# Patient Record
Sex: FEMALE | Race: WHITE | NOT HISPANIC OR LATINO | URBAN - METROPOLITAN AREA
[De-identification: names, ages, dates, MRNs, and addresses within clinical notes are randomized per-mention and may not be internally consistent; named-entity substitution may affect disease eponyms.]

---

## 2017-01-05 ENCOUNTER — OFFICE (AMBULATORY)
Dept: URBAN - METROPOLITAN AREA CLINIC 64 | Facility: CLINIC | Age: 47
End: 2017-01-05

## 2017-01-05 VITALS
HEIGHT: 67 IN | WEIGHT: 207 LBS | HEART RATE: 97 BPM | DIASTOLIC BLOOD PRESSURE: 81 MMHG | SYSTOLIC BLOOD PRESSURE: 136 MMHG

## 2017-01-05 DIAGNOSIS — R10.13 EPIGASTRIC PAIN: ICD-10-CM

## 2017-01-05 DIAGNOSIS — K25.9 GASTRIC ULCER, UNSPECIFIED AS ACUTE OR CHRONIC, WITHOUT HEMO: ICD-10-CM

## 2017-01-05 DIAGNOSIS — R10.12 LEFT UPPER QUADRANT PAIN: ICD-10-CM

## 2017-01-05 PROCEDURE — 99214 OFFICE O/P EST MOD 30 MIN: CPT | Performed by: INTERNAL MEDICINE

## 2017-01-05 RX ORDER — PANTOPRAZOLE SODIUM 40 MG/1
80 TABLET, DELAYED RELEASE ORAL
Qty: 60 | Refills: 11 | Status: ACTIVE
Start: 2017-01-05

## 2017-01-06 ENCOUNTER — OFFICE (AMBULATORY)
Dept: URBAN - METROPOLITAN AREA CLINIC 64 | Facility: CLINIC | Age: 47
End: 2017-01-06

## 2017-01-06 ENCOUNTER — ON CAMPUS - OUTPATIENT (AMBULATORY)
Dept: URBAN - METROPOLITAN AREA HOSPITAL 2 | Facility: HOSPITAL | Age: 47
End: 2017-01-06

## 2017-01-06 VITALS
OXYGEN SATURATION: 100 % | SYSTOLIC BLOOD PRESSURE: 100 MMHG | RESPIRATION RATE: 15 BRPM | SYSTOLIC BLOOD PRESSURE: 121 MMHG | OXYGEN SATURATION: 95 % | OXYGEN SATURATION: 98 % | OXYGEN SATURATION: 96 % | DIASTOLIC BLOOD PRESSURE: 56 MMHG | HEART RATE: 68 BPM | RESPIRATION RATE: 16 BRPM | HEART RATE: 72 BPM | HEART RATE: 69 BPM | TEMPERATURE: 97.4 F | DIASTOLIC BLOOD PRESSURE: 75 MMHG | HEIGHT: 67 IN | SYSTOLIC BLOOD PRESSURE: 143 MMHG | SYSTOLIC BLOOD PRESSURE: 125 MMHG | OXYGEN SATURATION: 97 % | HEART RATE: 89 BPM | DIASTOLIC BLOOD PRESSURE: 74 MMHG | SYSTOLIC BLOOD PRESSURE: 102 MMHG | OXYGEN SATURATION: 99 % | SYSTOLIC BLOOD PRESSURE: 119 MMHG | RESPIRATION RATE: 14 BRPM | DIASTOLIC BLOOD PRESSURE: 76 MMHG | DIASTOLIC BLOOD PRESSURE: 78 MMHG | WEIGHT: 201 LBS | DIASTOLIC BLOOD PRESSURE: 55 MMHG | HEART RATE: 70 BPM | SYSTOLIC BLOOD PRESSURE: 136 MMHG | RESPIRATION RATE: 18 BRPM | HEART RATE: 74 BPM

## 2017-01-06 DIAGNOSIS — K44.9 DIAPHRAGMATIC HERNIA WITHOUT OBSTRUCTION OR GANGRENE: ICD-10-CM

## 2017-01-06 DIAGNOSIS — K29.70 GASTRITIS, UNSPECIFIED, WITHOUT BLEEDING: ICD-10-CM

## 2017-01-06 DIAGNOSIS — R10.13 EPIGASTRIC PAIN: ICD-10-CM

## 2017-01-06 LAB
GI HISTOLOGY: A. SELECT: (no result)
GI HISTOLOGY: PDF REPORT: (no result)

## 2017-01-06 PROCEDURE — 88305 TISSUE EXAM BY PATHOLOGIST: CPT | Performed by: INTERNAL MEDICINE

## 2017-01-06 PROCEDURE — 43239 EGD BIOPSY SINGLE/MULTIPLE: CPT | Performed by: INTERNAL MEDICINE

## 2017-01-06 RX ORDER — TRAMADOL HYDROCHLORIDE 50 MG/1
200 TABLET, FILM COATED ORAL
Qty: 120 | Refills: 0 | Status: ACTIVE
Start: 2017-01-06

## 2017-01-06 RX ADMIN — PROPOFOL: 10 INJECTION, EMULSION INTRAVENOUS at 13:50

## 2017-02-13 ENCOUNTER — ON CAMPUS - OUTPATIENT (AMBULATORY)
Dept: URBAN - METROPOLITAN AREA HOSPITAL 77 | Facility: HOSPITAL | Age: 47
End: 2017-02-13

## 2017-02-13 DIAGNOSIS — R10.13 EPIGASTRIC PAIN: ICD-10-CM

## 2017-02-13 DIAGNOSIS — K86.2 CYST OF PANCREAS: ICD-10-CM

## 2017-02-13 DIAGNOSIS — R11.0 NAUSEA: ICD-10-CM

## 2017-02-13 DIAGNOSIS — K86.1 OTHER CHRONIC PANCREATITIS: ICD-10-CM

## 2017-02-13 PROCEDURE — 43237 ENDOSCOPIC US EXAM ESOPH: CPT | Performed by: INTERNAL MEDICINE

## 2017-03-01 ENCOUNTER — OFFICE (AMBULATORY)
Dept: URBAN - METROPOLITAN AREA CLINIC 64 | Facility: CLINIC | Age: 47
End: 2017-03-01

## 2017-03-01 VITALS
SYSTOLIC BLOOD PRESSURE: 138 MMHG | HEART RATE: 74 BPM | DIASTOLIC BLOOD PRESSURE: 68 MMHG | WEIGHT: 195 LBS | HEIGHT: 67 IN

## 2017-03-01 DIAGNOSIS — R10.13 EPIGASTRIC PAIN: ICD-10-CM

## 2017-03-01 DIAGNOSIS — K86.1 OTHER CHRONIC PANCREATITIS: ICD-10-CM

## 2017-03-01 PROCEDURE — 99214 OFFICE O/P EST MOD 30 MIN: CPT | Performed by: INTERNAL MEDICINE

## 2017-03-01 RX ORDER — TRAMADOL HYDROCHLORIDE 50 MG/1
200 TABLET, FILM COATED ORAL
Qty: 120 | Refills: 0 | Status: ACTIVE
Start: 2017-01-06

## 2017-03-01 RX ORDER — PANCRELIPASE 24000; 76000; 120000 [USP'U]/1; [USP'U]/1; [USP'U]/1
72 CAPSULE, DELAYED RELEASE PELLETS ORAL
Qty: 90 | Refills: 11 | Status: ACTIVE
Start: 2017-03-01

## 2017-03-02 LAB
726778 7+ALC-UNBUND: AMPHETAMINES, URINE: NEGATIVE NG/ML
726778 7+ALC-UNBUND: BARBITURATE: NEGATIVE NG/ML
726778 7+ALC-UNBUND: BENZODIAZEPINES: NEGATIVE NG/ML
726778 7+ALC-UNBUND: CANNABINOID: NEGATIVE NG/ML
726778 7+ALC-UNBUND: COCAINE (METAB.): NEGATIVE NG/ML
726778 7+ALC-UNBUND: ETHANOL U, QUAN: NEGATIVE %
726778 7+ALC-UNBUND: OPIATES: NEGATIVE NG/ML
726778 7+ALC-UNBUND: PHENCYCLIDINE: NEGATIVE NG/ML
AMYLASE, SERUM: 20 U/L — LOW (ref 31–124)
C-REACTIVE PROTEIN, QUANT: 1.2 MG/L (ref 0–4.9)
CBC WITH DIFFERENTIAL/PLATELET: BASO (ABSOLUTE): 0 X10E3/UL (ref 0–0.2)
CBC WITH DIFFERENTIAL/PLATELET: BASOS: 0 %
CBC WITH DIFFERENTIAL/PLATELET: EOS (ABSOLUTE): 0.1 X10E3/UL (ref 0–0.4)
CBC WITH DIFFERENTIAL/PLATELET: EOS: 1 %
CBC WITH DIFFERENTIAL/PLATELET: HEMATOCRIT: 39 % (ref 34–46.6)
CBC WITH DIFFERENTIAL/PLATELET: HEMATOLOGY COMMENTS: (no result)
CBC WITH DIFFERENTIAL/PLATELET: HEMOGLOBIN: 13 G/DL (ref 11.1–15.9)
CBC WITH DIFFERENTIAL/PLATELET: IMMATURE CELLS: (no result)
CBC WITH DIFFERENTIAL/PLATELET: IMMATURE GRANS (ABS): 0 X10E3/UL (ref 0–0.1)
CBC WITH DIFFERENTIAL/PLATELET: IMMATURE GRANULOCYTES: 0 %
CBC WITH DIFFERENTIAL/PLATELET: LYMPHS (ABSOLUTE): 1.9 X10E3/UL (ref 0.7–3.1)
CBC WITH DIFFERENTIAL/PLATELET: LYMPHS: 28 %
CBC WITH DIFFERENTIAL/PLATELET: MCH: 29.8 PG (ref 26.6–33)
CBC WITH DIFFERENTIAL/PLATELET: MCHC: 33.3 G/DL (ref 31.5–35.7)
CBC WITH DIFFERENTIAL/PLATELET: MCV: 89 FL (ref 79–97)
CBC WITH DIFFERENTIAL/PLATELET: MONOCYTES(ABSOLUTE): 0.3 X10E3/UL (ref 0.1–0.9)
CBC WITH DIFFERENTIAL/PLATELET: MONOCYTES: 4 %
CBC WITH DIFFERENTIAL/PLATELET: NEUTROPHILS (ABSOLUTE): 4.6 X10E3/UL (ref 1.4–7)
CBC WITH DIFFERENTIAL/PLATELET: NEUTROPHILS: 67 %
CBC WITH DIFFERENTIAL/PLATELET: NRBC: (no result)
CBC WITH DIFFERENTIAL/PLATELET: PLATELETS: 387 X10E3/UL — HIGH (ref 150–379)
CBC WITH DIFFERENTIAL/PLATELET: RBC: 4.36 X10E6/UL (ref 3.77–5.28)
CBC WITH DIFFERENTIAL/PLATELET: RDW: 13.9 % (ref 12.3–15.4)
CBC WITH DIFFERENTIAL/PLATELET: WBC: 6.8 X10E3/UL (ref 3.4–10.8)
COMP. METABOLIC PANEL (14): A/G RATIO: 1.7 (ref 1.1–2.5)
COMP. METABOLIC PANEL (14): ALBUMIN, SERUM: 4.4 G/DL (ref 3.5–5.5)
COMP. METABOLIC PANEL (14): ALKALINE PHOSPHATASE, S: 70 IU/L (ref 39–117)
COMP. METABOLIC PANEL (14): ALT (SGPT): 20 IU/L (ref 0–32)
COMP. METABOLIC PANEL (14): AST (SGOT): 12 IU/L (ref 0–40)
COMP. METABOLIC PANEL (14): BILIRUBIN, TOTAL: 0.5 MG/DL (ref 0–1.2)
COMP. METABOLIC PANEL (14): BUN/CREATININE RATIO: 9 (ref 9–23)
COMP. METABOLIC PANEL (14): BUN: 8 MG/DL (ref 6–24)
COMP. METABOLIC PANEL (14): CALCIUM, SERUM: 9.5 MG/DL (ref 8.7–10.2)
COMP. METABOLIC PANEL (14): CARBON DIOXIDE, TOTAL: 22 MMOL/L (ref 18–29)
COMP. METABOLIC PANEL (14): CHLORIDE, SERUM: 105 MMOL/L (ref 96–106)
COMP. METABOLIC PANEL (14): CREATININE, SERUM: 0.88 MG/DL (ref 0.57–1)
COMP. METABOLIC PANEL (14): EGFR IF AFRICN AM: 91 ML/MIN/1.73 (ref 59–?)
COMP. METABOLIC PANEL (14): EGFR IF NONAFRICN AM: 79 ML/MIN/1.73 (ref 59–?)
COMP. METABOLIC PANEL (14): GLOBULIN, TOTAL: 2.6 G/DL (ref 1.5–4.5)
COMP. METABOLIC PANEL (14): GLUCOSE, SERUM: 81 MG/DL (ref 65–99)
COMP. METABOLIC PANEL (14): POTASSIUM, SERUM: 4.2 MMOL/L (ref 3.5–5.2)
COMP. METABOLIC PANEL (14): PROTEIN, TOTAL, SERUM: 7 G/DL (ref 6–8.5)
COMP. METABOLIC PANEL (14): SODIUM, SERUM: 142 MMOL/L (ref 134–144)
LIPASE, SERUM: 19 U/L (ref 0–59)

## 2017-03-09 ENCOUNTER — ON CAMPUS - OUTPATIENT (AMBULATORY)
Dept: URBAN - METROPOLITAN AREA HOSPITAL 85 | Facility: HOSPITAL | Age: 47
End: 2017-03-09

## 2017-03-09 DIAGNOSIS — R19.7 DIARRHEA, UNSPECIFIED: ICD-10-CM

## 2017-03-09 DIAGNOSIS — R10.11 RIGHT UPPER QUADRANT PAIN: ICD-10-CM

## 2017-03-09 DIAGNOSIS — R11.2 NAUSEA WITH VOMITING, UNSPECIFIED: ICD-10-CM

## 2017-03-09 DIAGNOSIS — R63.4 ABNORMAL WEIGHT LOSS: ICD-10-CM

## 2017-03-09 PROCEDURE — 99243 OFF/OP CNSLTJ NEW/EST LOW 30: CPT | Performed by: NURSE PRACTITIONER

## 2017-03-10 PROCEDURE — 99213 OFFICE O/P EST LOW 20 MIN: CPT | Performed by: NURSE PRACTITIONER

## 2017-05-03 ENCOUNTER — OFFICE (AMBULATORY)
Dept: URBAN - METROPOLITAN AREA CLINIC 64 | Facility: CLINIC | Age: 47
End: 2017-05-03
Payer: COMMERCIAL

## 2017-05-03 VITALS
SYSTOLIC BLOOD PRESSURE: 128 MMHG | HEIGHT: 67 IN | HEART RATE: 78 BPM | DIASTOLIC BLOOD PRESSURE: 69 MMHG | WEIGHT: 208 LBS

## 2017-05-03 DIAGNOSIS — R10.11 RIGHT UPPER QUADRANT PAIN: ICD-10-CM

## 2017-05-03 DIAGNOSIS — K86.1 OTHER CHRONIC PANCREATITIS: ICD-10-CM

## 2017-05-03 PROCEDURE — 99213 OFFICE O/P EST LOW 20 MIN: CPT | Performed by: INTERNAL MEDICINE

## 2019-11-02 ENCOUNTER — APPOINTMENT (OUTPATIENT)
Dept: CT IMAGING | Facility: HOSPITAL | Age: 49
End: 2019-11-02

## 2019-11-02 ENCOUNTER — APPOINTMENT (OUTPATIENT)
Dept: GENERAL RADIOLOGY | Facility: HOSPITAL | Age: 49
End: 2019-11-02

## 2019-11-02 ENCOUNTER — HOSPITAL ENCOUNTER (OUTPATIENT)
Facility: HOSPITAL | Age: 49
Setting detail: OBSERVATION
Discharge: HOME | End: 2019-11-03
Attending: EMERGENCY MEDICINE | Admitting: FAMILY MEDICINE

## 2019-11-02 DIAGNOSIS — I67.4 HYPERTENSIVE ENCEPHALOPATHY: ICD-10-CM

## 2019-11-02 DIAGNOSIS — R42 LIGHTHEADED: Primary | ICD-10-CM

## 2019-11-02 DIAGNOSIS — I16.1 HYPERTENSIVE EMERGENCY: ICD-10-CM

## 2019-11-02 LAB
ALBUMIN SERPL-MCNC: 4.6 G/DL (ref 3.5–5.2)
ALBUMIN/GLOB SERPL: 1.2 G/DL
ALP SERPL-CCNC: 92 U/L (ref 39–117)
ALT SERPL W P-5'-P-CCNC: 12 U/L (ref 1–33)
AMPHET+METHAMPHET UR QL: POSITIVE
ANION GAP SERPL CALCULATED.3IONS-SCNC: 13 MMOL/L (ref 5–15)
AST SERPL-CCNC: 22 U/L (ref 1–32)
BARBITURATES UR QL SCN: NEGATIVE
BASOPHILS # BLD AUTO: 0.1 10*3/MM3 (ref 0–0.2)
BASOPHILS NFR BLD AUTO: 0.7 % (ref 0–1.5)
BENZODIAZ UR QL SCN: NEGATIVE
BILIRUB SERPL-MCNC: 0.2 MG/DL (ref 0.2–1.2)
BILIRUB UR QL STRIP: NEGATIVE
BUN BLD-MCNC: <2 MG/DL (ref 6–20)
BUN/CREAT SERPL: ABNORMAL
CALCIUM SPEC-SCNC: 9 MG/DL (ref 8.6–10.5)
CANNABINOIDS SERPL QL: NEGATIVE
CHLORIDE SERPL-SCNC: 107 MMOL/L (ref 98–107)
CLARITY UR: CLEAR
CO2 SERPL-SCNC: 19 MMOL/L (ref 22–29)
COCAINE UR QL: NEGATIVE
COLOR UR: YELLOW
CREAT BLD-MCNC: 0.77 MG/DL (ref 0.57–1)
D-LACTATE SERPL-SCNC: 1.3 MMOL/L (ref 0.5–2)
DEPRECATED RDW RBC AUTO: 49 FL (ref 37–54)
EOSINOPHIL # BLD AUTO: 0.1 10*3/MM3 (ref 0–0.4)
EOSINOPHIL NFR BLD AUTO: 1 % (ref 0.3–6.2)
ERYTHROCYTE [DISTWIDTH] IN BLOOD BY AUTOMATED COUNT: 14.5 % (ref 12.3–15.4)
ETHANOL UR QL: <0.01 %
GFR SERPL CREATININE-BSD FRML MDRD: 80 ML/MIN/1.73
GLOBULIN UR ELPH-MCNC: 3.7 GM/DL
GLUCOSE BLD-MCNC: 117 MG/DL (ref 65–99)
GLUCOSE UR STRIP-MCNC: NEGATIVE MG/DL
HCT VFR BLD AUTO: 43.3 % (ref 34–46.6)
HGB BLD-MCNC: 14.6 G/DL (ref 12–15.9)
HGB UR QL STRIP.AUTO: NEGATIVE
HOLD SPECIMEN: NORMAL
HOLD SPECIMEN: NORMAL
KETONES UR QL STRIP: NEGATIVE
LEUKOCYTE ESTERASE UR QL STRIP.AUTO: NEGATIVE
LYMPHOCYTES # BLD AUTO: 2.4 10*3/MM3 (ref 0.7–3.1)
LYMPHOCYTES NFR BLD AUTO: 30.3 % (ref 19.6–45.3)
MCH RBC QN AUTO: 32.2 PG (ref 26.6–33)
MCHC RBC AUTO-ENTMCNC: 33.7 G/DL (ref 31.5–35.7)
MCV RBC AUTO: 95.7 FL (ref 79–97)
METHADONE UR QL SCN: NEGATIVE
MONOCYTES # BLD AUTO: 0.5 10*3/MM3 (ref 0.1–0.9)
MONOCYTES NFR BLD AUTO: 6.4 % (ref 5–12)
NEUTROPHILS # BLD AUTO: 4.9 10*3/MM3 (ref 1.7–7)
NEUTROPHILS NFR BLD AUTO: 61.6 % (ref 42.7–76)
NITRITE UR QL STRIP: NEGATIVE
NRBC BLD AUTO-RTO: 0 /100 WBC (ref 0–0.2)
NT-PROBNP SERPL-MCNC: 68.4 PG/ML (ref 5–450)
OPIATES UR QL: NEGATIVE
OXYCODONE UR QL SCN: NEGATIVE
PH UR STRIP.AUTO: 5.5 [PH] (ref 5–8)
PLATELET # BLD AUTO: 473 10*3/MM3 (ref 140–450)
PMV BLD AUTO: 7.3 FL (ref 6–12)
POTASSIUM BLD-SCNC: 4 MMOL/L (ref 3.5–5.2)
PROT SERPL-MCNC: 8.3 G/DL (ref 6–8.5)
PROT UR QL STRIP: ABNORMAL
RBC # BLD AUTO: 4.53 10*6/MM3 (ref 3.77–5.28)
SODIUM BLD-SCNC: 139 MMOL/L (ref 136–145)
SP GR UR STRIP: 1.02 (ref 1–1.03)
TROPONIN T SERPL-MCNC: <0.01 NG/ML (ref 0–0.03)
UROBILINOGEN UR QL STRIP: ABNORMAL
WBC NRBC COR # BLD: 7.9 10*3/MM3 (ref 3.4–10.8)
WHOLE BLOOD HOLD SPECIMEN: NORMAL
WHOLE BLOOD HOLD SPECIMEN: NORMAL

## 2019-11-02 PROCEDURE — G0378 HOSPITAL OBSERVATION PER HR: HCPCS

## 2019-11-02 PROCEDURE — 80307 DRUG TEST PRSMV CHEM ANLYZR: CPT | Performed by: EMERGENCY MEDICINE

## 2019-11-02 PROCEDURE — 71045 X-RAY EXAM CHEST 1 VIEW: CPT

## 2019-11-02 PROCEDURE — 85025 COMPLETE CBC W/AUTO DIFF WBC: CPT | Performed by: EMERGENCY MEDICINE

## 2019-11-02 PROCEDURE — 96374 THER/PROPH/DIAG INJ IV PUSH: CPT

## 2019-11-02 PROCEDURE — 82962 GLUCOSE BLOOD TEST: CPT

## 2019-11-02 PROCEDURE — 82088 ASSAY OF ALDOSTERONE: CPT | Performed by: FAMILY MEDICINE

## 2019-11-02 PROCEDURE — 83605 ASSAY OF LACTIC ACID: CPT

## 2019-11-02 PROCEDURE — 87040 BLOOD CULTURE FOR BACTERIA: CPT | Performed by: EMERGENCY MEDICINE

## 2019-11-02 PROCEDURE — 80053 COMPREHEN METABOLIC PANEL: CPT | Performed by: EMERGENCY MEDICINE

## 2019-11-02 PROCEDURE — P9612 CATHETERIZE FOR URINE SPEC: HCPCS

## 2019-11-02 PROCEDURE — 84244 ASSAY OF RENIN: CPT | Performed by: FAMILY MEDICINE

## 2019-11-02 PROCEDURE — 80307 DRUG TEST PRSMV CHEM ANLYZR: CPT | Performed by: FAMILY MEDICINE

## 2019-11-02 PROCEDURE — 84484 ASSAY OF TROPONIN QUANT: CPT | Performed by: EMERGENCY MEDICINE

## 2019-11-02 PROCEDURE — 81003 URINALYSIS AUTO W/O SCOPE: CPT | Performed by: EMERGENCY MEDICINE

## 2019-11-02 PROCEDURE — 99219 PR INITIAL OBSERVATION CARE/DAY 50 MINUTES: CPT | Performed by: FAMILY MEDICINE

## 2019-11-02 PROCEDURE — 70450 CT HEAD/BRAIN W/O DYE: CPT

## 2019-11-02 PROCEDURE — 93005 ELECTROCARDIOGRAM TRACING: CPT

## 2019-11-02 PROCEDURE — 93005 ELECTROCARDIOGRAM TRACING: CPT | Performed by: EMERGENCY MEDICINE

## 2019-11-02 PROCEDURE — 83880 ASSAY OF NATRIURETIC PEPTIDE: CPT | Performed by: FAMILY MEDICINE

## 2019-11-02 PROCEDURE — 99285 EMERGENCY DEPT VISIT HI MDM: CPT

## 2019-11-02 RX ORDER — OLANZAPINE 20 MG/1
30 TABLET ORAL NIGHTLY
Status: ON HOLD | COMMUNITY
End: 2020-06-02 | Stop reason: SDUPTHER

## 2019-11-02 RX ORDER — SODIUM CHLORIDE 0.9 % (FLUSH) 0.9 %
10 SYRINGE (ML) INJECTION AS NEEDED
Status: DISCONTINUED | OUTPATIENT
Start: 2019-11-02 | End: 2019-11-03 | Stop reason: HOSPADM

## 2019-11-02 RX ORDER — FAMOTIDINE 20 MG/1
40 TABLET, FILM COATED ORAL DAILY
Status: DISCONTINUED | OUTPATIENT
Start: 2019-11-02 | End: 2019-11-03 | Stop reason: HOSPADM

## 2019-11-02 RX ORDER — KETOROLAC TROMETHAMINE 30 MG/ML
30 INJECTION, SOLUTION INTRAMUSCULAR; INTRAVENOUS EVERY 6 HOURS PRN
Status: DISCONTINUED | OUTPATIENT
Start: 2019-11-02 | End: 2019-11-03 | Stop reason: HOSPADM

## 2019-11-02 RX ORDER — GABAPENTIN 800 MG/1
800 TABLET ORAL 4 TIMES DAILY
COMMUNITY

## 2019-11-02 RX ORDER — ONDANSETRON 4 MG/1
4 TABLET, FILM COATED ORAL EVERY 6 HOURS PRN
Status: DISCONTINUED | OUTPATIENT
Start: 2019-11-02 | End: 2019-11-03 | Stop reason: HOSPADM

## 2019-11-02 RX ORDER — BUPROPION HYDROCHLORIDE 150 MG/1
150 TABLET ORAL DAILY
Status: DISCONTINUED | OUTPATIENT
Start: 2019-11-02 | End: 2019-11-03 | Stop reason: HOSPADM

## 2019-11-02 RX ORDER — ACETAMINOPHEN 325 MG/1
650 TABLET ORAL EVERY 4 HOURS PRN
Status: DISCONTINUED | OUTPATIENT
Start: 2019-11-02 | End: 2019-11-03 | Stop reason: HOSPADM

## 2019-11-02 RX ORDER — ACETAMINOPHEN 650 MG/1
650 SUPPOSITORY RECTAL EVERY 4 HOURS PRN
Status: DISCONTINUED | OUTPATIENT
Start: 2019-11-02 | End: 2019-11-03 | Stop reason: HOSPADM

## 2019-11-02 RX ORDER — BUSPIRONE HYDROCHLORIDE 15 MG/1
15 TABLET ORAL 2 TIMES DAILY
Status: DISCONTINUED | OUTPATIENT
Start: 2019-11-02 | End: 2019-11-03 | Stop reason: HOSPADM

## 2019-11-02 RX ORDER — MIRTAZAPINE 15 MG/1
90 TABLET, FILM COATED ORAL NIGHTLY
Status: DISCONTINUED | OUTPATIENT
Start: 2019-11-02 | End: 2019-11-03 | Stop reason: HOSPADM

## 2019-11-02 RX ORDER — SODIUM CHLORIDE 0.9 % (FLUSH) 0.9 %
10 SYRINGE (ML) INJECTION EVERY 12 HOURS SCHEDULED
Status: DISCONTINUED | OUTPATIENT
Start: 2019-11-02 | End: 2019-11-03 | Stop reason: HOSPADM

## 2019-11-02 RX ORDER — GABAPENTIN 400 MG/1
800 CAPSULE ORAL NIGHTLY
Status: DISCONTINUED | OUTPATIENT
Start: 2019-11-02 | End: 2019-11-02

## 2019-11-02 RX ORDER — AMLODIPINE BESYLATE 5 MG/1
5 TABLET ORAL
Status: DISCONTINUED | OUTPATIENT
Start: 2019-11-02 | End: 2019-11-03 | Stop reason: HOSPADM

## 2019-11-02 RX ORDER — GABAPENTIN 400 MG/1
800 CAPSULE ORAL EVERY 8 HOURS SCHEDULED
Status: DISCONTINUED | OUTPATIENT
Start: 2019-11-02 | End: 2019-11-03 | Stop reason: HOSPADM

## 2019-11-02 RX ORDER — MAGNESIUM SULFATE HEPTAHYDRATE 40 MG/ML
2 INJECTION, SOLUTION INTRAVENOUS AS NEEDED
Status: DISCONTINUED | OUTPATIENT
Start: 2019-11-02 | End: 2019-11-03 | Stop reason: HOSPADM

## 2019-11-02 RX ORDER — POTASSIUM CHLORIDE 20 MEQ/1
40 TABLET, EXTENDED RELEASE ORAL AS NEEDED
Status: DISCONTINUED | OUTPATIENT
Start: 2019-11-02 | End: 2019-11-03 | Stop reason: HOSPADM

## 2019-11-02 RX ORDER — NICOTINE 21 MG/24HR
1 PATCH, TRANSDERMAL 24 HOURS TRANSDERMAL EVERY 24 HOURS
Status: CANCELLED | OUTPATIENT
Start: 2019-11-02

## 2019-11-02 RX ORDER — NITROGLYCERIN 0.4 MG/1
0.4 TABLET SUBLINGUAL
Status: DISCONTINUED | OUTPATIENT
Start: 2019-11-02 | End: 2019-11-03 | Stop reason: HOSPADM

## 2019-11-02 RX ORDER — ONDANSETRON 2 MG/ML
4 INJECTION INTRAMUSCULAR; INTRAVENOUS EVERY 6 HOURS PRN
Status: DISCONTINUED | OUTPATIENT
Start: 2019-11-02 | End: 2019-11-03 | Stop reason: HOSPADM

## 2019-11-02 RX ORDER — BUSPIRONE HYDROCHLORIDE 15 MG/1
15 TABLET ORAL 4 TIMES DAILY PRN
COMMUNITY

## 2019-11-02 RX ORDER — POTASSIUM CHLORIDE 1.5 G/1.77G
40 POWDER, FOR SOLUTION ORAL AS NEEDED
Status: DISCONTINUED | OUTPATIENT
Start: 2019-11-02 | End: 2019-11-03 | Stop reason: HOSPADM

## 2019-11-02 RX ORDER — BUPROPION HYDROCHLORIDE 150 MG/1
300 TABLET ORAL DAILY
COMMUNITY
End: 2020-09-09

## 2019-11-02 RX ORDER — ACETAMINOPHEN 160 MG/5ML
650 SOLUTION ORAL EVERY 4 HOURS PRN
Status: DISCONTINUED | OUTPATIENT
Start: 2019-11-02 | End: 2019-11-03 | Stop reason: HOSPADM

## 2019-11-02 RX ORDER — MAGNESIUM SULFATE HEPTAHYDRATE 40 MG/ML
4 INJECTION, SOLUTION INTRAVENOUS AS NEEDED
Status: DISCONTINUED | OUTPATIENT
Start: 2019-11-02 | End: 2019-11-03 | Stop reason: HOSPADM

## 2019-11-02 RX ORDER — NICOTINE 21 MG/24HR
1 PATCH, TRANSDERMAL 24 HOURS TRANSDERMAL
Status: DISCONTINUED | OUTPATIENT
Start: 2019-11-02 | End: 2019-11-03 | Stop reason: HOSPADM

## 2019-11-02 RX ORDER — OLANZAPINE 10 MG/1
30 TABLET ORAL NIGHTLY
Status: DISCONTINUED | OUTPATIENT
Start: 2019-11-02 | End: 2019-11-03 | Stop reason: HOSPADM

## 2019-11-02 RX ORDER — MIRTAZAPINE 45 MG/1
90 TABLET, FILM COATED ORAL NIGHTLY
COMMUNITY

## 2019-11-02 RX ORDER — LABETALOL HYDROCHLORIDE 5 MG/ML
20 INJECTION, SOLUTION INTRAVENOUS ONCE
Status: COMPLETED | OUTPATIENT
Start: 2019-11-02 | End: 2019-11-02

## 2019-11-02 RX ADMIN — FAMOTIDINE 40 MG: 20 TABLET, FILM COATED ORAL at 15:34

## 2019-11-02 RX ADMIN — BUSPIRONE HYDROCHLORIDE 15 MG: 15 TABLET ORAL at 20:04

## 2019-11-02 RX ADMIN — OLANZAPINE 30 MG: 10 TABLET, FILM COATED ORAL at 20:03

## 2019-11-02 RX ADMIN — ACETAMINOPHEN 650 MG: 325 TABLET ORAL at 21:13

## 2019-11-02 RX ADMIN — LABETALOL 20 MG/4 ML (5 MG/ML) INTRAVENOUS SYRINGE 20 MG: at 10:57

## 2019-11-02 RX ADMIN — SODIUM CHLORIDE 1000 ML: 900 INJECTION, SOLUTION INTRAVENOUS at 10:57

## 2019-11-02 RX ADMIN — Medication 10 ML: at 20:04

## 2019-11-02 RX ADMIN — GABAPENTIN 800 MG: 400 CAPSULE ORAL at 21:13

## 2019-11-02 RX ADMIN — MIRTAZAPINE 90 MG: 15 TABLET, FILM COATED ORAL at 20:04

## 2019-11-02 RX ADMIN — NICOTINE 1 PATCH: 21 PATCH TRANSDERMAL at 15:31

## 2019-11-02 RX ADMIN — BUPROPION HYDROCHLORIDE 150 MG: 150 TABLET, EXTENDED RELEASE ORAL at 15:34

## 2019-11-02 RX ADMIN — AMLODIPINE BESYLATE 5 MG: 5 TABLET ORAL at 15:33

## 2019-11-02 RX ADMIN — SODIUM CHLORIDE 1000 ML: 900 INJECTION, SOLUTION INTRAVENOUS at 12:11

## 2019-11-02 NOTE — H&P
[facility] Medicine Services        Patient Name:  Kristy Falk  YOB: 1970  MRN:  [id number]  Admit Date:  11/2/2019    Patient Care Team:  Elizabeth Goode APRN as PCP - General            History Present Illness     Chief Complaint   Patient presents with   • Altered Mental Status       Ms. Falk is a 49 y.o. with history of tobacco abuse, previous alcohol abuse now in remission presents for evaluation of confusion and palpitations for less than 24 hours.  Patient reports upon waking up this morning and going about her day she became progressively anxious and then minimally responsive though awake.  Patient reports she became paralyzed by anxiety and started to feel palpitations.  Patient has chronic issues with anxiety and is on multiple medications.  Patient denied any previous fevers or chills, denied headache or focal neurological deficits.  No nausea or vomiting no chest pain or shortness of breath.  Patient reports she has a history of intermittent hypertension getting worse over the last few months.  She has been turned away by her dentist multiple times for procedures given her blood pressure has been in the 170s and above.  Patient has not seen her primary care physician about her hypertension.  Patient has former history of alcohol abuse but has been sober for 3 years.  Patient reports smoking 2 packs/day and drinking at least a pot of coffee daily.  Patient reports numerous stressors at home including conflict with their teenage daughter as contributing to her stress levels.  Patient has no history of cardiac arrhythmias or stroke.  No significant first generation history of heart disease or CVA.  No history of renal disease.    In the emergency department patient was tachycardic with heart rate in the 110s to 130s with initial blood pressure systolics in the 220s.  Patient given labetalol 20 mg IV x1 and blood pressures dropped to systolics in the 80s.   Patient given IV fluids and blood pressure improved to 110s.  Patient's anxiety improved and remained hemodynamically stable.  Patient reports palpitations have resolved and per patient's spouse she is now acting baseline.  EKG showing sinus tachycardia, negative troponin.  CT head showing no acute pathology.  Chest x-ray unremarkable.  Patient admitted for observation given labile blood pressure.      Review of Systems   Constitution: Negative for chills, fever, weakness and night sweats.   Eyes: Negative for blurred vision and double vision.   Cardiovascular: Positive for palpitations. Negative for chest pain and irregular heartbeat.   Respiratory: Negative for cough, shortness of breath and sputum production.    Musculoskeletal: Negative for back pain and joint swelling.   Gastrointestinal: Negative for nausea and vomiting.   Neurological: Negative for excessive daytime sleepiness, dizziness, focal weakness, headaches and light-headedness.   Psychiatric/Behavioral: Negative for altered mental status and depression. The patient is nervous/anxious.            Personal History     Past Medical History:   Diagnosis Date   • Anxiety    • Depression      History reviewed. No pertinent surgical history.  History reviewed. No pertinent family history.  Social History     Tobacco Use   • Smoking status: Current Every Day Smoker     Packs/day: 1.50   Substance Use Topics   • Alcohol use: No     Frequency: Never   • Drug use: No     Prior to Admission medications    Medication Sig Start Date End Date Taking? Authorizing Provider   buPROPion XL (WELLBUTRIN XL) 150 MG 24 hr tablet Take 150 mg by mouth Daily.   Yes Darwin Montesinos MD   busPIRone (BUSPAR) 15 MG tablet Take 15 mg by mouth 2 (Two) Times a Day.   Yes Darwin Montesinos MD   gabapentin (NEURONTIN) 800 MG tablet Take 800 mg by mouth 4 (Four) Times a Day.   Yes Darwin Montesinos MD   mirtazapine (REMERON) 45 MG tablet Take 90 mg by mouth Every Night.    Yes Provider, MD Darwin   OLANZapine (ZYPREXA) 20 MG tablet Take 30 mg by mouth Every Night.   Yes Provider, MD Darwin     Allergies:  No Known Allergies      Objective     Vital Signs  Temp:  [99.1 °F (37.3 °C)] 99.1 °F (37.3 °C)  Heart Rate:  [] 75  Resp:  [16-20] 20  BP: ()/() 111/76  SpO2:  [95 %-97 %] 95 %  on   ;   Device (Oxygen Therapy): room air  Body mass index is 29 kg/m².    Physical Exam   Constitutional: She is oriented to person, place, and time.   Overweight female sitting up in bed, awake and alert engaging in conversation, breathing comfortably on room air and appearing in no acute distress   HENT:   Head: Normocephalic and atraumatic.   Right Ear: External ear normal.   Left Ear: External ear normal.   Mouth/Throat: Oropharynx is clear and moist.   Eyes: Conjunctivae and EOM are normal. Right eye exhibits no discharge. Left eye exhibits no discharge.   Neck: Normal range of motion. Neck supple. No JVD present. No tracheal deviation present.   Cardiovascular: Normal rate, regular rhythm and normal heart sounds. Exam reveals no friction rub.   No murmur heard.  Pulmonary/Chest: Effort normal and breath sounds normal. No stridor. No respiratory distress. She has no wheezes. She has no rales.   Abdominal: Soft. She exhibits no distension. There is no tenderness. There is no guarding.   Musculoskeletal: Normal range of motion. She exhibits no tenderness or deformity.   Neurological: She is alert and oriented to person, place, and time. No cranial nerve deficit. Coordination normal.   Skin: Skin is warm and dry. No rash noted. No erythema. No pallor.   Psychiatric: She has a normal mood and affect. Her behavior is normal. Thought content normal.       Results Review:  I reviewed the patient's new clinical results.  I reviewed the patient's new imaging results and agree with the interpretation.  I reviewed the patient's other test results and agree with the interpretation  I  personally viewed and interpreted the patient's EKG/Telemetry data  Discussed with ED provider.    Results from last 7 days   Lab Units 11/02/19  1102   WBC 10*3/mm3 7.90   HEMOGLOBIN g/dL 14.6   HEMATOCRIT % 43.3   PLATELETS 10*3/mm3 473*     Results from last 7 days   Lab Units 11/02/19  1101   SODIUM mmol/L 139   POTASSIUM mmol/L 4.0   CHLORIDE mmol/L 107   CO2 mmol/L 19.0*   BUN mg/dL <2*   CREATININE mg/dL 0.77   GLUCOSE mg/dL 117*   CALCIUM mg/dL 9.0     Results from last 7 days   Lab Units 11/02/19  1101   SODIUM mmol/L 139   POTASSIUM mmol/L 4.0   CHLORIDE mmol/L 107   CO2 mmol/L 19.0*   BUN mg/dL <2*   CREATININE mg/dL 0.77   CALCIUM mg/dL 9.0   BILIRUBIN mg/dL 0.2   ALK PHOS U/L 92   ALT (SGPT) U/L 12   AST (SGOT) U/L 22   GLUCOSE mg/dL 117*         Lab Results   Component Value Date    CALCIUM 9.0 11/02/2019     No results found for: HGBA1C  No results found for: CHOL, CHLPL, TRIG, HDL, LDL, LDLDIRECT  Lab Results   Component Value Date    LIPASE 28 03/10/2017             Microbiology Results (last 10 days)     ** No results found for the last 240 hours. **          ECG/EMG Results (most recent)     Procedure Component Value Units Date/Time    ECG 12 Lead [[id number]] Collected:  11/02/19 1012     Updated:  11/02/19 1013    Narrative:       HEART RATE= 139  bpm  RR Interval= 432  ms  PA Interval= 165  ms  P Horizontal Axis= -1  deg  P Front Axis= 48  deg  QRSD Interval= 88  ms  QT Interval= 288  ms  QRS Axis= 48  deg  T Wave Axis= -59  deg  - ABNORMAL ECG -  Sinus tachycardia  Probable left atrial enlargement  Inferior infarct, age indeterminate  Electronically Signed By:   Date and Time of Study: 2019-11-02 10:12:27                    Ct Head Without Contrast    Result Date: 11/2/2019  No evidence of hemorrhage, mass effect or midline shift. No acute process identified.  Electronically Signed By-Ana Coleman On:11/2/2019 11:59 AM This report was finalized on [id number] by  Ana Coleman, .    Xr Chest 1  View    Result Date: 11/2/2019  No acute cardiopulmonary process.  Electronically Signed By-Ana Coleman On:11/2/2019 11:17 AM This report was finalized on [id number] by  Ana Coleman, .        Estimated Creatinine Clearance: 98.5 mL/min (by C-G formula based on SCr of 0.77 mg/dL).      Assessment/Plan     Active Hospital Problems    Diagnosis POA   • Lightheaded [R42] Yes     Patient is a 49-year-old female with history of tobacco abuse and intermittent hypertension presented with hypertensive urgency responding to antihypertensives, currently hemodynamically stable    Altered mental status -with palpitations, likely multifactorial with history of baseline anxiety and emotional stress as well as uncontrolled hypertension.  CT head showing no signs of intracranial bleed or concerning pathology.  Possible component of transient hypertensive encephalopathy.  -Monitor blood pressure  -If mental status deteriorated would get MRI brain  -U tox positive for methamphetamines however patient is on bupropion which often shows up as methamphetamines on U tox, patient reports and  confirms that she is in her third year of sobriety  -Serum alcohol level  -Resume home psych medications    Hypertensive urgency -no current signs of endorgan damage  -Extremely sensitive to antihypertensives given drop from pressures of 220s to 80s with 1 dose of labetalol  -Trial of 5 mg of amlodipine  -Renal ultrasound  -TSH and cortisol levels  -Full electrolyte panels  -Echocardiogram    Tachycardia -sinus likely associated with anxiety, now resolved  -Telemetry  -Monitor    Tobacco abuse -reports 2 pack/day habit, craving nicotine at this time  -Cessation advised  -Nicotine replacement    Anxiety -with other underlying mood disorders on multiple psychotropic meds  -Resume  -May need as needed anxiety medication    Alcohol abuse in remission -no current signs of intoxication.  Reports sobriety for 3 years  -Serum alcohol  -Can monitor  for signs of withdrawal    DVT prophylaxis -SCDs    Disposition -observation, monitor for 24 hours to ensure blood pressure stable, follow-up with primary care        · I discussed the patients findings and my recommendations with patient and family.        Code Status - Full code.       Gabo Rosales MD  [facility], operated by [facility] Hospitalist Team  11/02/19  1:22 PM

## 2019-11-02 NOTE — ED NOTES
Patient reports dizziness and feeling like her heart is racing since 0900.  Patient's  states patient had episode that she was slow to respond this am.  Patient alert and oriented x 3 at this time.     Kristy Manley RN  11/02/19 1024

## 2019-11-02 NOTE — ED PROVIDER NOTES
Subjective   Chief complaint lightheaded.  This is a 49-year-old who had an episode about 2 hours prior to arrival where she felt lightheaded and anxious and jittery stating that she had some mild diffuse shaking all over.  The  stated that during the episode the patient would only answer yes and no questions and would not elucidate any further.  The patient states that she felt like she was having anxiety attack at the time.  She denies any numbness or weakness or difficulty swallowing or thinking.  She states that she was able to give more extensive answers but did not do so.  She denies any history of trauma.  No chest pain or shortness of breath no nausea vomiting or diarrhea.  Headache.  She states that she has no alcohol or drug use but does drink significant amount of caffeine about 1 pot of coffee per day        History provided by:  Patient and significant other      Review of Systems   Constitutional: Negative for chills and fever.   HENT: Negative for congestion and sore throat.    Eyes: Negative for redness.   Respiratory: Negative for shortness of breath.    Cardiovascular: Negative for chest pain.   Gastrointestinal: Negative for abdominal pain.   Endocrine: Negative for cold intolerance and heat intolerance.   Genitourinary: Negative for difficulty urinating and dysuria.   Musculoskeletal: Negative for back pain.   Skin: Negative for rash.   Allergic/Immunologic: Negative for immunocompromised state.   Neurological: Positive for light-headedness. Negative for dizziness and weakness.   Hematological: Negative for adenopathy.   Psychiatric/Behavioral: Negative for confusion.   All other systems reviewed and are negative.      Past Medical History:   Diagnosis Date   • Anxiety    • Depression        No Known Allergies    History reviewed. No pertinent surgical history.    History reviewed. No pertinent family history.    Social History     Socioeconomic History   • Marital status:       Spouse name: Not on file   • Number of children: Not on file   • Years of education: Not on file   • Highest education level: Not on file   Tobacco Use   • Smoking status: Current Every Day Smoker     Packs/day: 1.50   Substance and Sexual Activity   • Alcohol use: No     Frequency: Never   • Drug use: No           Objective   Physical Exam   Constitutional: She is oriented to person, place, and time. She appears well-developed and well-nourished. No distress.   HENT:   Head: Normocephalic and atraumatic.   Right Ear: External ear normal.   Left Ear: External ear normal.   Nose: Nose normal.   Eyes: Conjunctivae and EOM are normal. Pupils are equal, round, and reactive to light.   Neck: Normal range of motion. Neck supple. No JVD present.   Cardiovascular: Regular rhythm, normal heart sounds and intact distal pulses. Tachycardia present.   Pulmonary/Chest: Effort normal and breath sounds normal. No stridor. She has no wheezes. She has no rales.   Abdominal: Soft. Bowel sounds are normal. She exhibits no mass. There is no tenderness. There is no rebound and no guarding. No hernia.   Musculoskeletal: Normal range of motion.   Lymphadenopathy:     She has no cervical adenopathy.   Neurological: She is alert and oriented to person, place, and time. No cranial nerve deficit. GCS eye subscore is 4. GCS verbal subscore is 5. GCS motor subscore is 6.   NIH stroke scale 0.  Strength 5/5, equal.  No pronator drift arm drift or leg drift.  No facial asymmetry.  No receptive or expressive aphasia   Skin: Skin is warm and dry. Capillary refill takes less than 2 seconds. No rash noted.   Psychiatric: She has a normal mood and affect.   Nursing note and vitals reviewed.      Procedures           ED Course      CT Head Without Contrast   Final Result   No evidence of hemorrhage, mass effect or midline shift. No acute   process identified.       Electronically Signed ByYves Coleman On:11/2/2019 11:59 AM   This report was finalized  on [id number] by  Ana Coleman, .      XR Chest 1 View   Final Result   No acute cardiopulmonary process.       Electronically Signed By-Ana Coleman On:11/2/2019 11:17 AM   This report was finalized on [id number] by  Ana Coleman, .        Lab Results (last 72 hours)     Procedure Component Value Units Date/Time    POC Lactate [[id number]] Collected:  11/02/19 1059    Specimen:  Blood Updated:  11/02/19 1100    POC Lactate [[id number]]  (Normal) Collected:  11/02/19 1059    Specimen:  Blood Updated:  11/02/19 1100     Lactate 1.3 mmol/L      Comment: Serial Number: [id number]:  [id number]       Comprehensive Metabolic Panel [[id number]]  (Abnormal) Collected:  11/02/19 1101    Specimen:  Blood from Arm, Left Updated:  11/02/19 1151     Glucose 117 mg/dL      BUN <2 mg/dL      Creatinine 0.77 mg/dL      Sodium 139 mmol/L      Potassium 4.0 mmol/L      Chloride 107 mmol/L      CO2 19.0 mmol/L      Calcium 9.0 mg/dL      Total Protein 8.3 g/dL      Albumin 4.60 g/dL      ALT (SGPT) 12 U/L      AST (SGOT) 22 U/L      Comment: Specimen hemolyzed.  Results may be affected.        Alkaline Phosphatase 92 U/L      Total Bilirubin 0.2 mg/dL      eGFR Non African Amer 80 mL/min/1.73      Globulin 3.7 gm/dL      A/G Ratio 1.2 g/dL      BUN/Creatinine Ratio --     Comment: Unable to calculate Bun/Crea Ratio.        Anion Gap 13.0 mmol/L     Narrative:       GFR Normal >60  Chronic Kidney Disease <60  Kidney Failure <15    Troponin [[id number]]  (Normal) Collected:  11/02/19 1101    Specimen:  Blood from Arm, Left Updated:  11/02/19 1142     Troponin T <0.010 ng/mL     Narrative:       Troponin T Reference Range:  <= 0.03 ng/mL-   Negative for AMI  >0.03 ng/mL-     Abnormal for myocardial necrosis.  Clinicians would have to utilize clinical acumen, EKG, Troponin and serial changes to determine if it is an Acute Myocardial Infarction or myocardial injury due to an underlying chronic condition.     CBC & Differential  [[id number]] Collected:  11/02/19 1102    Specimen:  Blood Updated:  11/02/19 1113    Narrative:       The following orders were created for panel order CBC & Differential.  Procedure                               Abnormality         Status                     ---------                               -----------         ------                     CBC Auto Differential[[id number]]        Abnormal            Final result                 Please view results for these tests on the individual orders.    CBC Auto Differential [[id number]]  (Abnormal) Collected:  11/02/19 1102    Specimen:  Blood from Arm, Left Updated:  11/02/19 1113     WBC 7.90 10*3/mm3      RBC 4.53 10*6/mm3      Hemoglobin 14.6 g/dL      Hematocrit 43.3 %      MCV 95.7 fL      MCH 32.2 pg      MCHC 33.7 g/dL      RDW 14.5 %      RDW-SD 49.0 fl      MPV 7.3 fL      Platelets 473 10*3/mm3      Neutrophil % 61.6 %      Lymphocyte % 30.3 %      Monocyte % 6.4 %      Eosinophil % 1.0 %      Basophil % 0.7 %      Neutrophils, Absolute 4.90 10*3/mm3      Lymphocytes, Absolute 2.40 10*3/mm3      Monocytes, Absolute 0.50 10*3/mm3      Eosinophils, Absolute 0.10 10*3/mm3      Basophils, Absolute 0.10 10*3/mm3      nRBC 0.0 /100 WBC     Blood Culture - Blood, Arm, Right [[id number]] Collected:  11/02/19 1104    Specimen:  Blood from Arm, Right Updated:  11/02/19 1110    Blood Culture - Blood, Arm, Right [[id number]] Collected:  11/02/19 1106    Specimen:  Blood from Arm, Right Updated:  11/02/19 1109    Urinalysis With Culture If Indicated - Urine, Clean Catch [[id number]]  (Abnormal) Collected:  11/02/19 1146    Specimen:  Urine, Clean Catch Updated:  11/02/19 1210     Color, UA Yellow     Appearance, UA Clear     pH, UA 5.5     Specific Gravity, UA 1.024     Glucose, UA Negative     Ketones, UA Negative     Bilirubin, UA Negative     Blood, UA Negative     Protein, UA Trace     Leuk Esterase, UA Negative     Nitrite, UA Negative     Urobilinogen, UA 0.2 E.U./dL     "Narrative:       Urine microscopic not indicated.    Urine Drug Screen - Urine, Clean Catch [[id number]]  (Abnormal) Collected:  11/02/19 1146    Specimen:  Urine, Clean Catch Updated:  11/02/19 1238     Amphet/Methamphet, Screen Positive     Barbiturates Screen, Urine Negative     Benzodiazepine Screen, Urine Negative     Cocaine Screen, Urine Negative     Opiate Screen Negative     THC, Screen, Urine Negative     Methadone Screen, Urine Negative     Oxycodone Screen, Urine Negative    Narrative:       Negative Thresholds For Drugs Screened:     Amphetamines               500 ng/ml   Barbiturates               200 ng/ml   Benzodiazepines            100 ng/ml   Cocaine                    300 ng/ml   Methadone                  300 ng/ml   Opiates                    300 ng/ml   Oxycodone                  100 ng/ml   THC                        50 ng/ml    The Normal Value for all drugs tested is negative. This report includes final unconfirmed screening results to be used for medical treatment purposes only. Unconfirmed results must not be used for non-medical purposes such as employment or legal testing. Clinical consideration should be applied to any drug of abuse test, particulary when unconfirmed results are used.  All urine drugs of abuse requests without chain of custody are for medical screening purposes only.  False positives are possible.          Medications   sodium chloride 0.9 % flush 10 mL (not administered)   sodium chloride 0.9 % bolus 1,000 mL (1,000 mL Intravenous New Bag 11/2/19 1211)   sodium chloride 0.9 % bolus 1,000 mL (0 mL Intravenous Stopped 11/2/19 1145)   labetalol (NORMODYNE,TRANDATE) injection 20 mg (20 mg Intravenous Given 11/2/19 1057)     /68   Pulse 73   Temp 99.1 °F (37.3 °C) (Oral)   Resp 16   Ht 170.2 cm (67\")   Wt 84 kg (185 lb 3 oz)   LMP 10/30/2019 (Approximate)   SpO2 95%   BMI 29.00 kg/m²   EKG personally reviewed and interpreted by me shows:    Sinus tachycardia " with no acute ST elevation.  Further information as documented on EKG.    Patient is placed in IV and monitor started on labetalol for blood pressure and tachycardia.  Patient initially had improvement and then subsequently became hypotensive but responded immediately to IV fluid bolus.  I discussed results with the patient as well as called and discussed results with the hospitalist physician who agreed to admit          MDM  Differential diagnosis; this does not constitute the entirety of considered causes:      Intracranial hemorrhage, intracranial tumor, hypertensive crisis, stroke, TIA    Acute coronary syndrome, pneumothorax, pneumonia, dissection, electrolyte abnormality, anemia, DVT, pulmonary embolus          Critical care time 35 minutes exclusive of any separate billable procedure time  Final diagnoses:   Lightheaded   Hypertensive emergency   Hypertensive encephalopathy              Kenney Nielsen MD  11/02/19 1755

## 2019-11-02 NOTE — PLAN OF CARE
Problem: Hypertensive Disease/Crisis (Arterial) (Adult)  Goal: Signs and Symptoms of Listed Potential Problems Will be Absent, Minimized or Managed (Hypertensive Disease/Crisis)   11/02/19 1876   Goal/Outcome Evaluation   Problems Assessed (Hypertensive Disease/Crisis (Arterial)) all   Problems Present (Hypertensive Disease) none

## 2019-11-03 ENCOUNTER — HOSPITAL ENCOUNTER (OUTPATIENT)
Dept: CARDIOLOGY | Facility: HOSPITAL | Age: 49
Setting detail: OBSERVATION
Discharge: HOME | End: 2019-11-03

## 2019-11-03 ENCOUNTER — APPOINTMENT (OUTPATIENT)
Dept: ULTRASOUND IMAGING | Facility: HOSPITAL | Age: 49
End: 2019-11-03

## 2019-11-03 VITALS
SYSTOLIC BLOOD PRESSURE: 146 MMHG | TEMPERATURE: 98.8 F | HEIGHT: 67 IN | HEART RATE: 79 BPM | OXYGEN SATURATION: 96 % | WEIGHT: 192.68 LBS | BODY MASS INDEX: 30.24 KG/M2 | DIASTOLIC BLOOD PRESSURE: 84 MMHG | RESPIRATION RATE: 17 BRPM

## 2019-11-03 VITALS
DIASTOLIC BLOOD PRESSURE: 71 MMHG | BODY MASS INDEX: 30.13 KG/M2 | HEIGHT: 67 IN | WEIGHT: 192 LBS | SYSTOLIC BLOOD PRESSURE: 121 MMHG

## 2019-11-03 LAB
ALBUMIN SERPL-MCNC: 3.5 G/DL (ref 3.5–5.2)
ALBUMIN/GLOB SERPL: 1.6 G/DL
ALP SERPL-CCNC: 63 U/L (ref 39–117)
ALT SERPL W P-5'-P-CCNC: 8 U/L (ref 1–33)
ANION GAP SERPL CALCULATED.3IONS-SCNC: 6 MMOL/L (ref 5–15)
AST SERPL-CCNC: 11 U/L (ref 1–32)
BASOPHILS # BLD AUTO: 0 10*3/MM3 (ref 0–0.2)
BASOPHILS NFR BLD AUTO: 0.3 % (ref 0–1.5)
BILIRUB SERPL-MCNC: <0.2 MG/DL (ref 0.2–1.2)
BUN BLD-MCNC: 3 MG/DL (ref 6–20)
BUN/CREAT SERPL: 5 (ref 7–25)
CALCIUM SPEC-SCNC: 8.1 MG/DL (ref 8.6–10.5)
CHLORIDE SERPL-SCNC: 112 MMOL/L (ref 98–107)
CHOLEST SERPL-MCNC: 154 MG/DL (ref 0–200)
CO2 SERPL-SCNC: 25 MMOL/L (ref 22–29)
CORTIS SERPL-MCNC: 11.5 MCG/DL
CREAT BLD-MCNC: 0.6 MG/DL (ref 0.57–1)
DEPRECATED RDW RBC AUTO: 47.3 FL (ref 37–54)
EOSINOPHIL # BLD AUTO: 0.1 10*3/MM3 (ref 0–0.4)
EOSINOPHIL NFR BLD AUTO: 1.4 % (ref 0.3–6.2)
ERYTHROCYTE [DISTWIDTH] IN BLOOD BY AUTOMATED COUNT: 14.1 % (ref 12.3–15.4)
GFR SERPL CREATININE-BSD FRML MDRD: 106 ML/MIN/1.73
GLOBULIN UR ELPH-MCNC: 2.2 GM/DL
GLUCOSE BLD-MCNC: 104 MG/DL (ref 65–99)
HBA1C MFR BLD: 4.9 % (ref 3.5–5.6)
HCT VFR BLD AUTO: 35 % (ref 34–46.6)
HDLC SERPL-MCNC: 52 MG/DL (ref 40–60)
HGB BLD-MCNC: 11.6 G/DL (ref 12–15.9)
LDLC SERPL CALC-MCNC: 88 MG/DL (ref 0–100)
LDLC/HDLC SERPL: 1.69 {RATIO}
LYMPHOCYTES # BLD AUTO: 1.6 10*3/MM3 (ref 0.7–3.1)
LYMPHOCYTES NFR BLD AUTO: 29.3 % (ref 19.6–45.3)
MAGNESIUM SERPL-MCNC: 1.9 MG/DL (ref 1.6–2.6)
MCH RBC QN AUTO: 31.4 PG (ref 26.6–33)
MCHC RBC AUTO-ENTMCNC: 33 G/DL (ref 31.5–35.7)
MCV RBC AUTO: 95.1 FL (ref 79–97)
MONOCYTES # BLD AUTO: 0.4 10*3/MM3 (ref 0.1–0.9)
MONOCYTES NFR BLD AUTO: 6.7 % (ref 5–12)
NEUTROPHILS # BLD AUTO: 3.3 10*3/MM3 (ref 1.7–7)
NEUTROPHILS NFR BLD AUTO: 62.3 % (ref 42.7–76)
NRBC BLD AUTO-RTO: 0.1 /100 WBC (ref 0–0.2)
PHOSPHATE SERPL-MCNC: 2.4 MG/DL (ref 2.5–4.5)
PLATELET # BLD AUTO: 331 10*3/MM3 (ref 140–450)
PMV BLD AUTO: 6.8 FL (ref 6–12)
POTASSIUM BLD-SCNC: 4.3 MMOL/L (ref 3.5–5.2)
PROT SERPL-MCNC: 5.7 G/DL (ref 6–8.5)
RBC # BLD AUTO: 3.68 10*6/MM3 (ref 3.77–5.28)
SODIUM BLD-SCNC: 143 MMOL/L (ref 136–145)
TRIGL SERPL-MCNC: 71 MG/DL (ref 0–150)
TSH SERPL DL<=0.05 MIU/L-ACNC: 0.37 UIU/ML (ref 0.27–4.2)
VLDLC SERPL-MCNC: 14.2 MG/DL
WBC NRBC COR # BLD: 5.4 10*3/MM3 (ref 3.4–10.8)

## 2019-11-03 PROCEDURE — G0378 HOSPITAL OBSERVATION PER HR: HCPCS

## 2019-11-03 PROCEDURE — 84100 ASSAY OF PHOSPHORUS: CPT | Performed by: FAMILY MEDICINE

## 2019-11-03 PROCEDURE — 80061 LIPID PANEL: CPT | Performed by: FAMILY MEDICINE

## 2019-11-03 PROCEDURE — 84443 ASSAY THYROID STIM HORMONE: CPT | Performed by: FAMILY MEDICINE

## 2019-11-03 PROCEDURE — 85025 COMPLETE CBC W/AUTO DIFF WBC: CPT | Performed by: FAMILY MEDICINE

## 2019-11-03 PROCEDURE — 93306 TTE W/DOPPLER COMPLETE: CPT

## 2019-11-03 PROCEDURE — 76775 US EXAM ABDO BACK WALL LIM: CPT

## 2019-11-03 PROCEDURE — 99217 PR OBSERVATION CARE DISCHARGE MANAGEMENT: CPT | Performed by: FAMILY MEDICINE

## 2019-11-03 PROCEDURE — 83735 ASSAY OF MAGNESIUM: CPT | Performed by: FAMILY MEDICINE

## 2019-11-03 PROCEDURE — 82533 TOTAL CORTISOL: CPT | Performed by: FAMILY MEDICINE

## 2019-11-03 PROCEDURE — 80053 COMPREHEN METABOLIC PANEL: CPT | Performed by: FAMILY MEDICINE

## 2019-11-03 PROCEDURE — 83036 HEMOGLOBIN GLYCOSYLATED A1C: CPT | Performed by: FAMILY MEDICINE

## 2019-11-03 RX ORDER — AMLODIPINE BESYLATE 5 MG/1
5 TABLET ORAL
Qty: 30 TABLET | Refills: 0 | Status: ON HOLD | OUTPATIENT
Start: 2019-11-04 | End: 2020-06-02

## 2019-11-03 RX ADMIN — BUSPIRONE HYDROCHLORIDE 15 MG: 15 TABLET ORAL at 09:22

## 2019-11-03 RX ADMIN — Medication 10 ML: at 09:24

## 2019-11-03 RX ADMIN — GABAPENTIN 800 MG: 400 CAPSULE ORAL at 06:36

## 2019-11-03 RX ADMIN — ACETAMINOPHEN 650 MG: 325 TABLET ORAL at 13:43

## 2019-11-03 RX ADMIN — AMLODIPINE BESYLATE 5 MG: 5 TABLET ORAL at 09:22

## 2019-11-03 RX ADMIN — BUPROPION HYDROCHLORIDE 150 MG: 150 TABLET, EXTENDED RELEASE ORAL at 09:22

## 2019-11-03 RX ADMIN — NICOTINE 1 PATCH: 21 PATCH TRANSDERMAL at 10:00

## 2019-11-03 RX ADMIN — GABAPENTIN 800 MG: 400 CAPSULE ORAL at 13:44

## 2019-11-03 RX ADMIN — FAMOTIDINE 40 MG: 20 TABLET, FILM COATED ORAL at 09:22

## 2019-11-03 NOTE — NURSING NOTE
Notified Dr. Rosales of patient requesting to be discharged and still awaiting results of Echo. Echo unsure of when results would be read. Dr. Rosales gave verbal of patient to follow up with PCP for results. Educated patient of information and patient verbalized understanding. Patient tolerated IV being DC without complaints of pain or discomfort.

## 2019-11-03 NOTE — PLAN OF CARE
Problem: Patient Care Overview  Goal: Plan of Care Review  Outcome: Ongoing (interventions implemented as appropriate)  Patient has no complaints, will continue to monitor  Goal: Individualization and Mutuality  Outcome: Ongoing (interventions implemented as appropriate)    Goal: Discharge Needs Assessment  Outcome: Ongoing (interventions implemented as appropriate)    Goal: Interprofessional Rounds/Family Conf  Outcome: Ongoing (interventions implemented as appropriate)

## 2019-11-03 NOTE — DISCHARGE SUMMARY
Date of Admission: 11/2/2019    Date of Discharge:  11/3/2019    Length of stay:  LOS: 0 days     Discharge Diagnosis:     Hypertensive urgency    Presenting Problem List:    Altered mental status -with palpitations, likely multifactorial with history of baseline anxiety and emotional stress as well as uncontrolled hypertension.  CT head showing no signs of intracranial bleed or concerning pathology.  Possible component of transient hypertensive encephalopathy.  -Monitor blood pressure  -If mental status deteriorated would get MRI brain  -U tox positive for methamphetamines however patient is on bupropion which often shows up as methamphetamines on U tox, patient reports and  confirms that she is in her third year of sobriety  -Serum alcohol level  -Resume home psych medications     Hypertensive urgency -no current signs of endorgan damage  -Extremely sensitive to antihypertensives given drop from pressures of 220s to 80s with 1 dose of labetalol  -Trial of 5 mg of amlodipine  -Renal ultrasound  -TSH and cortisol levels  -Full electrolyte panels  -Echocardiogram     Tachycardia -sinus likely associated with anxiety, now resolved  -Telemetry  -Monitor     Tobacco abuse -reports 2 pack/day habit, craving nicotine at this time  -Cessation advised  -Nicotine replacement     Anxiety -with other underlying mood disorders on multiple psychotropic meds  -Resume  -May need as needed anxiety medication     Alcohol abuse in remission -no current signs of intoxication.  Reports sobriety for 3 years  -Serum alcohol  -Can monitor for signs of withdrawal     DVT prophylaxis -SCDs    HPI/Hospital Course:  Ms. Falk is a 49 y.o. with history of tobacco abuse, previous alcohol abuse now in remission presents for evaluation of confusion and palpitations for less than 24 hours.  Patient reports upon waking up this morning and going about her day she became progressively anxious and then minimally responsive though awake.   Patient reports she became paralyzed by anxiety and started to feel palpitations.  Patient has chronic issues with anxiety and is on multiple medications.  Patient denied any previous fevers or chills, denied headache or focal neurological deficits.  No nausea or vomiting no chest pain or shortness of breath.  Patient reports she has a history of intermittent hypertension getting worse over the last few months.  She has been turned away by her dentist multiple times for procedures given her blood pressure has been in the 170s and above.  Patient has not seen her primary care physician about her hypertension.  Patient has former history of alcohol abuse but has been sober for 3 years.  Patient reports smoking 2 packs/day and drinking at least a pot of coffee daily.  Patient reports numerous stressors at home including conflict with their teenage daughter as contributing to her stress levels.  Patient has no history of cardiac arrhythmias or stroke.  No significant first generation history of heart disease or CVA.  No history of renal disease.     In the emergency department patient was tachycardic with heart rate in the 110s to 130s with initial blood pressure systolics in the 220s.  Patient given labetalol 20 mg IV x1 and blood pressures dropped to systolics in the 80s.  Patient given IV fluids and blood pressure improved to 110s.  Patient's anxiety improved and remained hemodynamically stable.  Patient reports palpitations have resolved and per patient's spouse she is now acting baseline.  EKG showing sinus tachycardia, negative troponin.  CT head showing no acute pathology.  Chest x-ray unremarkable.  Patient admitted for observation given labile blood pressure.    Patient admitted to hospital floor in hemodynamically stable condition.  Patient started on amlodipine for blood pressure control.  Patient had no further episodes of hypertensive urgency and anxiety and palpitations resolved.  Patient's heart rate was  controlled and in sinus.  Patient's follow-up exams showed no acute pathology.  Renal ultrasound normal.  Thyroid and cortisol normal.  Patient will be discharged home in good condition with follow-up organized with primary care for blood pressure monitoring.  Patient's echocardiogram final read was not available on discharge but will be sent to primary care for further evaluation.  Return precautions given.    Procedures Performed         Consults:   Consults     Date and Time Order Name Status Description    11/2/2019 1237 Hospitalist (on-call MD unless specified) Completed           Pertinent Test Results:     Lab Results (last 24 hours)     Procedure Component Value Units Date/Time    Blood Culture - Blood, Arm, Right [[id number]] Collected:  11/02/19 1104    Specimen:  Blood from Arm, Right Updated:  11/03/19 1015     Blood Culture No growth at 24 hours    Blood Culture - Blood, Arm, Right [[id number]] Collected:  11/02/19 1106    Specimen:  Blood from Arm, Right Updated:  11/03/19 1015     Blood Culture No growth at 24 hours    Hemoglobin A1c [[id number]]  (Normal) Collected:  11/03/19 0503    Specimen:  Blood Updated:  11/03/19 0846     Hemoglobin A1C 4.9 %     Narrative:       Hemoglobin A1C Reference Range:    <5.7 %        Normal  5.7-6.4 %     Increased risk for diabetes  > 6.4 %        Diabetes       These guidelines have been recommended by the American Diabetic Association for Hgb A1c.      The following 2010 guidelines have been recommended by the American Diabetes Association for Hemoglobin A1c.    HBA1c 5.7-6.4% Increased risk for future diabetes (pre-diabetes)  HBA1c     >6.4% Diabetes    Comprehensive Metabolic Panel [[id number]]  (Abnormal) Collected:  11/03/19 0503    Specimen:  Blood Updated:  11/03/19 0608     Glucose 104 mg/dL      BUN 3 mg/dL      Creatinine 0.60 mg/dL      Sodium 143 mmol/L      Potassium 4.3 mmol/L      Chloride 112 mmol/L      CO2 25.0 mmol/L      Calcium 8.1 mg/dL      Total  Protein 5.7 g/dL      Albumin 3.50 g/dL      ALT (SGPT) 8 U/L      AST (SGOT) 11 U/L      Alkaline Phosphatase 63 U/L      Total Bilirubin <0.2 mg/dL      eGFR Non African Amer 106 mL/min/1.73      Globulin 2.2 gm/dL      A/G Ratio 1.6 g/dL      BUN/Creatinine Ratio 5.0     Anion Gap 6.0 mmol/L     Narrative:       GFR Normal >60  Chronic Kidney Disease <60  Kidney Failure <15    Magnesium [[id number]]  (Normal) Collected:  11/03/19 0503    Specimen:  Blood Updated:  11/03/19 0607     Magnesium 1.9 mg/dL     Phosphorus [[id number]]  (Abnormal) Collected:  11/03/19 0503    Specimen:  Blood Updated:  11/03/19 0607     Phosphorus 2.4 mg/dL     Lipid Panel [[id number]] Collected:  11/03/19 0503    Specimen:  Blood Updated:  11/03/19 0607     Total Cholesterol 154 mg/dL      Triglycerides 71 mg/dL      HDL Cholesterol 52 mg/dL      LDL Cholesterol  88 mg/dL      VLDL Cholesterol 14.2 mg/dL      LDL/HDL Ratio 1.69    Narrative:       Cholesterol Reference Ranges  (U.S. Department of Health and Human Services ATP III Classifications)    Desirable          <200 mg/dL  Borderline High    200-239 mg/dL  High Risk          >240 mg/dL      Triglyceride Reference Ranges  (U.S. Department of Health and Human Services ATP III Classifications)    Normal           <150 mg/dL  Borderline High  150-199 mg/dL  High             200-499 mg/dL  Very High        >500 mg/dL    HDL Reference Ranges  (U.S. Department of Health and Human Services ATP III Classifcations)    Low     <40 mg/dl (major risk factor for CHD)  High    >60 mg/dl ('negative' risk factor for CHD)        LDL Reference Ranges  (U.S. Department of Health and Human Services ATP III Classifcations)    Optimal          <100 mg/dL  Near Optimal     100-129 mg/dL  Borderline High  130-159 mg/dL  High             160-189 mg/dL  Very High        >189 mg/dL    TSH [[id number]]  (Normal) Collected:  11/03/19 0503    Specimen:  Blood Updated:  11/03/19 0601     TSH 0.370 uIU/mL      Cortisol [[id number]] Collected:  11/03/19 0503    Specimen:  Blood Updated:  11/03/19 0601     Cortisol 11.50 mcg/dL     Narrative:       Cortisol Reference Ranges:    Cortisol 6AM - 10AM Range: 6.02-18.40 mcg/dl  Cortisol 4PM - 8PM Range: 2.68-10.50 mcg/dl      CBC Auto Differential [[id number]]  (Abnormal) Collected:  11/03/19 0503    Specimen:  Blood Updated:  11/03/19 0530     WBC 5.40 10*3/mm3      RBC 3.68 10*6/mm3      Hemoglobin 11.6 g/dL      Comment: Result checked         Hematocrit 35.0 %      MCV 95.1 fL      MCH 31.4 pg      MCHC 33.0 g/dL      RDW 14.1 %      RDW-SD 47.3 fl      MPV 6.8 fL      Platelets 331 10*3/mm3      Neutrophil % 62.3 %      Lymphocyte % 29.3 %      Monocyte % 6.7 %      Eosinophil % 1.4 %      Basophil % 0.3 %      Neutrophils, Absolute 3.30 10*3/mm3      Lymphocytes, Absolute 1.60 10*3/mm3      Monocytes, Absolute 0.40 10*3/mm3      Eosinophils, Absolute 0.10 10*3/mm3      Basophils, Absolute 0.00 10*3/mm3      nRBC 0.1 /100 WBC     BNP [[id number]]  (Normal) Collected:  11/02/19 1614    Specimen:  Blood Updated:  11/02/19 1702     proBNP 68.4 pg/mL     Narrative:       Among patients with dyspnea, NT-proBNP is highly sensitive for the detection of acute congestive heart failure. In addition NT-proBNP of <300 pg/ml effectively rules out acute congestive heart failure with 99% negative predictive value.          Microbiology Results Abnormal     Procedure Component Value - Date/Time    Blood Culture - Blood, Arm, Right [[id number]] Collected:  11/02/19 1104    Lab Status:  Preliminary result Specimen:  Blood from Arm, Right Updated:  11/03/19 1015     Blood Culture No growth at 24 hours    Blood Culture - Blood, Arm, Right [[id number]] Collected:  11/02/19 1106    Lab Status:  Preliminary result Specimen:  Blood from Arm, Right Updated:  11/03/19 1015     Blood Culture No growth at 24 hours        Ct Head Without Contrast    Result Date: 11/2/2019  No evidence of hemorrhage, mass  effect or midline shift. No acute process identified.  Electronically Signed ByYves Coleman On:11/2/2019 11:59 AM This report was finalized on [id number] by  Ilsa Mullen    Xr Chest 1 View    Result Date: 11/2/2019  No acute cardiopulmonary process.  Electronically Signed ByYves Coleman On:11/2/2019 11:17 AM This report was finalized on [id number] by  Ilsa Mullen    Us Renal Bilateral    Result Date: 11/3/2019  Unremarkable sonographic appearance of the kidneys. Specifically, no renal mass is identified on ultrasound.  Electronically Signed By-Ruby Garcia On:11/3/2019 9:53 AM This report was finalized on [id number] by  Ruby Garcia, .             Condition on Discharge:  Good    Vital Signs  Temp:  [98.2 °F (36.8 °C)-99 °F (37.2 °C)] 98.8 °F (37.1 °C)  Heart Rate:  [70-83] 79  Resp:  [16-17] 17  BP: (121-146)/(71-84) 146/84    Physical Exam:  General: Obese female sitting up in bed appearing comfortable breathing comfortably on room air  HEENT: NC/AT, EOMI, PERRLA  Heart: RRR. No murmur   Chest: CTAB, no w/r/r, normal respiratory effort  Abdominal: Soft. NT/ND. Bowel sounds present  Musculoskeletal: Normal ROM.  No edema. No calf tenderness.  Neurological: AAOx3, no focal deficits  Skin: Skin is warm and dry. No rash  Psychiatric: Normal mood and affect.      Discharge Disposition  Home or Self Care [1]    Discharge Medications     Discharge Medications      New Medications      Instructions Start Date   amLODIPine 5 MG tablet  Commonly known as:  NORVASC   5 mg, Oral, Every 24 Hours Scheduled   Start Date:  11/4/2019        Continue These Medications      Instructions Start Date   buPROPion  MG 24 hr tablet  Commonly known as:  WELLBUTRIN XL   150 mg, Oral, Daily      busPIRone 15 MG tablet  Commonly known as:  BUSPAR   15 mg, Oral, 2 Times Daily      gabapentin 800 MG tablet  Commonly known as:  NEURONTIN   800 mg, Oral, 4 Times Daily      mirtazapine 45 MG tablet  Commonly known as:   REMERON   90 mg, Oral, Nightly      ZYPREXA 20 MG tablet  Generic drug:  OLANZapine   30 mg, Oral, Nightly             Discharge Diet:       Activity at Discharge:       Follow-up Appointments  No future appointments.  Additional Instructions for the Follow-ups that You Need to Schedule     Discharge Follow-up with PCP   As directed       Currently Documented PCP:    Elizabeth Goode APRN    PCP Phone Number:    [phone]     Follow Up Details:  Please follow-up with your primary care physician within a week to reevaluate your blood pressure               Test Results Pending at Discharge   Order Current Status    Aldosterone / Renin Ratio In process    Blood Culture - Blood, Arm, Right Preliminary result    Blood Culture - Blood, Arm, Right Preliminary result           Risk for Readmission (LACE) Score: 2 (11/3/2019  6:00 AM)        Gabo Rosales MD  11/03/19  6:02 PM      Time: Discharge 25 min total time spent with face-to-face history/exam, writing all prescriptions, preparing medication reconciliation, and documenting discharge data including chart review of the full admission, care co-ordination with patient, family, , and , etc., and follow-up appointments with PCP and specialists as recommended.

## 2019-11-03 NOTE — PROGRESS NOTES
Continued Stay Note  NKECHI Devries     Patient Name: Kristy Falk  MRN: [id number]  Today's Date: 11/3/2019    Admit Date: 11/2/2019    Discharge Plan     Row Name 11/03/19 0934       Plan    Plan  Anticipate routine home.              Ana Albert RN

## 2019-11-04 LAB — GLUCOSE BLDC GLUCOMTR-MCNC: 100 MG/DL (ref 70–105)

## 2019-11-04 NOTE — PROGRESS NOTES
Case Management Discharge Note    Final Note: Home.         Final Discharge Disposition Code: 01 - home or self-care

## 2019-11-05 LAB
BH CV ECHO MEAS - ACS: 2.1 CM
BH CV ECHO MEAS - AI DEC SLOPE: 445.9 CM/SEC^2
BH CV ECHO MEAS - AI DEC TIME: 1.2 SEC
BH CV ECHO MEAS - AI MAX PG: 103 MMHG
BH CV ECHO MEAS - AI MAX VEL: 507.4 CM/SEC
BH CV ECHO MEAS - AI P1/2T: 333.3 MSEC
BH CV ECHO MEAS - AO MAX PG (FULL): 4 MMHG
BH CV ECHO MEAS - AO MAX PG: 9.6 MMHG
BH CV ECHO MEAS - AO MEAN PG (FULL): 2.8 MMHG
BH CV ECHO MEAS - AO MEAN PG: 5.7 MMHG
BH CV ECHO MEAS - AO ROOT AREA (BSA CORRECTED): 1.7
BH CV ECHO MEAS - AO ROOT AREA: 9.1 CM^2
BH CV ECHO MEAS - AO ROOT DIAM: 3.4 CM
BH CV ECHO MEAS - AO V2 MAX: 155 CM/SEC
BH CV ECHO MEAS - AO V2 MEAN: 115.9 CM/SEC
BH CV ECHO MEAS - AO V2 VTI: 34 CM
BH CV ECHO MEAS - AORTIC HR: 75.1 BPM
BH CV ECHO MEAS - AORTIC R-R: 0.8 SEC
BH CV ECHO MEAS - AVA(I,A): 2.6 CM^2
BH CV ECHO MEAS - AVA(I,D): 2.6 CM^2
BH CV ECHO MEAS - AVA(V,A): 2.7 CM^2
BH CV ECHO MEAS - AVA(V,D): 2.7 CM^2
BH CV ECHO MEAS - BSA(HAYCOCK): 2.1 M^2
BH CV ECHO MEAS - BSA: 2 M^2
BH CV ECHO MEAS - BZI_BMI: 30.1 KILOGRAMS/M^2
BH CV ECHO MEAS - BZI_METRIC_HEIGHT: 170.2 CM
BH CV ECHO MEAS - BZI_METRIC_WEIGHT: 87.1 KG
BH CV ECHO MEAS - CI(AO): 11.7 L/MIN/M^2
BH CV ECHO MEAS - CI(LVOT): 3.3 L/MIN/M^2
BH CV ECHO MEAS - CO(AO): 23.2 L/MIN
BH CV ECHO MEAS - CO(LVOT): 6.5 L/MIN
BH CV ECHO MEAS - EDV(CUBED): 126.5 ML
BH CV ECHO MEAS - EDV(MOD-SP4): 88.3 ML
BH CV ECHO MEAS - EDV(TEICH): 119.3 ML
BH CV ECHO MEAS - EF(CUBED): 68.5 %
BH CV ECHO MEAS - EF(MOD-SP4): 74.4 %
BH CV ECHO MEAS - EF(TEICH): 59.9 %
BH CV ECHO MEAS - ESV(CUBED): 39.8 ML
BH CV ECHO MEAS - ESV(MOD-SP4): 22.6 ML
BH CV ECHO MEAS - ESV(TEICH): 47.9 ML
BH CV ECHO MEAS - FS: 32 %
BH CV ECHO MEAS - IVS/LVPW: 0.97
BH CV ECHO MEAS - IVSD: 0.99 CM
BH CV ECHO MEAS - LA DIMENSION(2D): 3.8 CM
BH CV ECHO MEAS - LV DIASTOLIC VOL/BSA (35-75): 44.5 ML/M^2
BH CV ECHO MEAS - LV MASS(C)D: 183.7 GRAMS
BH CV ECHO MEAS - LV MASS(C)DI: 92.4 GRAMS/M^2
BH CV ECHO MEAS - LV MAX PG: 5.7 MMHG
BH CV ECHO MEAS - LV MEAN PG: 2.9 MMHG
BH CV ECHO MEAS - LV SYSTOLIC VOL/BSA (12-30): 11.4 ML/M^2
BH CV ECHO MEAS - LV V1 MAX: 118.9 CM/SEC
BH CV ECHO MEAS - LV V1 MEAN: 79.9 CM/SEC
BH CV ECHO MEAS - LV V1 VTI: 24.3 CM
BH CV ECHO MEAS - LVIDD: 5 CM
BH CV ECHO MEAS - LVIDS: 3.4 CM
BH CV ECHO MEAS - LVOT AREA: 3.6 CM^2
BH CV ECHO MEAS - LVOT DIAM: 2.1 CM
BH CV ECHO MEAS - LVPWD: 1 CM
BH CV ECHO MEAS - MR MAX PG: 46 MMHG
BH CV ECHO MEAS - MR MAX VEL: 339.2 CM/SEC
BH CV ECHO MEAS - MV A MAX VEL: 69 CM/SEC
BH CV ECHO MEAS - MV DEC SLOPE: 421.3 CM/SEC^2
BH CV ECHO MEAS - MV DEC TIME: 0.26 SEC
BH CV ECHO MEAS - MV E MAX VEL: 109.4 CM/SEC
BH CV ECHO MEAS - MV E/A: 1.6
BH CV ECHO MEAS - MV MAX PG: 5.5 MMHG
BH CV ECHO MEAS - MV MEAN PG: 2.1 MMHG
BH CV ECHO MEAS - MV V2 MAX: 117.1 CM/SEC
BH CV ECHO MEAS - MV V2 MEAN: 66.6 CM/SEC
BH CV ECHO MEAS - MV V2 VTI: 29.2 CM
BH CV ECHO MEAS - MVA(VTI): 3 CM^2
BH CV ECHO MEAS - PA ACC TIME: 0.15 SEC
BH CV ECHO MEAS - PA MAX PG (FULL): 1.1 MMHG
BH CV ECHO MEAS - PA MAX PG: 3.7 MMHG
BH CV ECHO MEAS - PA PR(ACCEL): 10.5 MMHG
BH CV ECHO MEAS - PA V2 MAX: 96.7 CM/SEC
BH CV ECHO MEAS - PULM A REVS DUR: 0.07 SEC
BH CV ECHO MEAS - PULM A REVS VEL: 24.6 CM/SEC
BH CV ECHO MEAS - PULM DIAS VEL: 67.3 CM/SEC
BH CV ECHO MEAS - PULM S/D: 1.2
BH CV ECHO MEAS - PULM SYS VEL: 83.8 CM/SEC
BH CV ECHO MEAS - PVA(V,A): 2.3 CM^2
BH CV ECHO MEAS - PVA(V,D): 2.3 CM^2
BH CV ECHO MEAS - QP/QS: 0.58
BH CV ECHO MEAS - RAP SYSTOLE: 3 MMHG
BH CV ECHO MEAS - RV MAX PG: 2.7 MMHG
BH CV ECHO MEAS - RV MEAN PG: 1.6 MMHG
BH CV ECHO MEAS - RV V1 MAX: 81.9 CM/SEC
BH CV ECHO MEAS - RV V1 MEAN: 58.8 CM/SEC
BH CV ECHO MEAS - RV V1 VTI: 19 CM
BH CV ECHO MEAS - RVDD: 2.9 CM
BH CV ECHO MEAS - RVOT AREA: 2.7 CM^2
BH CV ECHO MEAS - RVOT DIAM: 1.8 CM
BH CV ECHO MEAS - RVSP: 42.2 MMHG
BH CV ECHO MEAS - SI(AO): 155.5 ML/M^2
BH CV ECHO MEAS - SI(CUBED): 43.6 ML/M^2
BH CV ECHO MEAS - SI(LVOT): 43.9 ML/M^2
BH CV ECHO MEAS - SI(MOD-SP4): 33.1 ML/M^2
BH CV ECHO MEAS - SI(TEICH): 35.9 ML/M^2
BH CV ECHO MEAS - SV(AO): 309.1 ML
BH CV ECHO MEAS - SV(CUBED): 86.7 ML
BH CV ECHO MEAS - SV(LVOT): 87.2 ML
BH CV ECHO MEAS - SV(MOD-SP4): 65.7 ML
BH CV ECHO MEAS - SV(RVOT): 50.7 ML
BH CV ECHO MEAS - SV(TEICH): 71.4 ML
BH CV ECHO MEAS - TR MAX VEL: 312.9 CM/SEC

## 2019-11-05 PROCEDURE — 93306 TTE W/DOPPLER COMPLETE: CPT | Performed by: INTERNAL MEDICINE

## 2019-11-07 LAB
BACTERIA SPEC AEROBE CULT: NORMAL
BACTERIA SPEC AEROBE CULT: NORMAL

## 2019-11-08 LAB
ALDOST SERPL-MCNC: 8.4 NG/DL (ref 0–30)
ALDOST/RENIN PLAS-RTO: 11.6 {RATIO} (ref 0–30)
RENIN PLAS-CCNC: 0.73 NG/ML/HR (ref 0.17–5.38)

## 2020-06-01 ENCOUNTER — HOSPITAL ENCOUNTER (OUTPATIENT)
Facility: HOSPITAL | Age: 50
Setting detail: OBSERVATION
Discharge: HOME | End: 2020-06-02
Attending: INTERNAL MEDICINE | Admitting: INTERNAL MEDICINE

## 2020-06-01 ENCOUNTER — APPOINTMENT (OUTPATIENT)
Dept: CT IMAGING | Facility: HOSPITAL | Age: 50
End: 2020-06-01

## 2020-06-01 DIAGNOSIS — E87.6 HYPOKALEMIA: ICD-10-CM

## 2020-06-01 DIAGNOSIS — E87.8 HYPOCHLOREMIA: ICD-10-CM

## 2020-06-01 DIAGNOSIS — E83.42 HYPOMAGNESEMIA: ICD-10-CM

## 2020-06-01 DIAGNOSIS — R41.82 ALTERED MENTAL STATUS, UNSPECIFIED ALTERED MENTAL STATUS TYPE: Primary | ICD-10-CM

## 2020-06-01 DIAGNOSIS — I10 HYPERTENSION, UNSPECIFIED TYPE: ICD-10-CM

## 2020-06-01 LAB
ALBUMIN SERPL-MCNC: 4.4 G/DL (ref 3.5–5.2)
ALBUMIN/GLOB SERPL: 1.5 G/DL
ALP SERPL-CCNC: 86 U/L (ref 39–117)
ALT SERPL W P-5'-P-CCNC: 19 U/L (ref 1–33)
AMMONIA BLD-SCNC: 26 UMOL/L (ref 11–51)
AMPHET+METHAMPHET UR QL: POSITIVE
ANION GAP SERPL CALCULATED.3IONS-SCNC: 15 MMOL/L (ref 5–15)
AST SERPL-CCNC: 23 U/L (ref 1–32)
BARBITURATES UR QL SCN: NEGATIVE
BASOPHILS # BLD AUTO: 0 10*3/MM3 (ref 0–0.2)
BASOPHILS NFR BLD AUTO: 0.1 % (ref 0–1.5)
BENZODIAZ UR QL SCN: NEGATIVE
BILIRUB SERPL-MCNC: 0.4 MG/DL (ref 0.2–1.2)
BILIRUB UR QL STRIP: NEGATIVE
BUN BLD-MCNC: 3 MG/DL (ref 6–20)
BUN/CREAT SERPL: 4 (ref 7–25)
CALCIUM SPEC-SCNC: 8.6 MG/DL (ref 8.6–10.5)
CANNABINOIDS SERPL QL: NEGATIVE
CHLORIDE SERPL-SCNC: 89 MMOL/L (ref 98–107)
CLARITY UR: CLEAR
CO2 SERPL-SCNC: 17 MMOL/L (ref 22–29)
COCAINE UR QL: NEGATIVE
COLOR UR: YELLOW
CREAT BLD-MCNC: 0.75 MG/DL (ref 0.57–1)
DEPRECATED RDW RBC AUTO: 42.4 FL (ref 37–54)
EOSINOPHIL # BLD AUTO: 0 10*3/MM3 (ref 0–0.4)
EOSINOPHIL NFR BLD AUTO: 0.1 % (ref 0.3–6.2)
ERYTHROCYTE [DISTWIDTH] IN BLOOD BY AUTOMATED COUNT: 13.5 % (ref 12.3–15.4)
ETHANOL UR QL: 0.01 %
GFR SERPL CREATININE-BSD FRML MDRD: 82 ML/MIN/1.73
GLOBULIN UR ELPH-MCNC: 2.9 GM/DL
GLUCOSE BLD-MCNC: 120 MG/DL (ref 65–99)
GLUCOSE UR STRIP-MCNC: NEGATIVE MG/DL
HCT VFR BLD AUTO: 36.1 % (ref 34–46.6)
HGB BLD-MCNC: 13.2 G/DL (ref 12–15.9)
HGB UR QL STRIP.AUTO: NEGATIVE
INR PPP: 1.03 (ref 0.9–1.1)
KETONES UR QL STRIP: NEGATIVE
LEUKOCYTE ESTERASE UR QL STRIP.AUTO: NEGATIVE
LYMPHOCYTES # BLD AUTO: 1.3 10*3/MM3 (ref 0.7–3.1)
LYMPHOCYTES NFR BLD AUTO: 7.9 % (ref 19.6–45.3)
MAGNESIUM SERPL-MCNC: 1.3 MG/DL (ref 1.6–2.6)
MCH RBC QN AUTO: 32.6 PG (ref 26.6–33)
MCHC RBC AUTO-ENTMCNC: 36.6 G/DL (ref 31.5–35.7)
MCV RBC AUTO: 89.1 FL (ref 79–97)
METHADONE UR QL SCN: NEGATIVE
MONOCYTES # BLD AUTO: 0.8 10*3/MM3 (ref 0.1–0.9)
MONOCYTES NFR BLD AUTO: 4.6 % (ref 5–12)
NEUTROPHILS # BLD AUTO: 14.6 10*3/MM3 (ref 1.7–7)
NEUTROPHILS NFR BLD AUTO: 87.3 % (ref 42.7–76)
NITRITE UR QL STRIP: NEGATIVE
NRBC BLD AUTO-RTO: 0 /100 WBC (ref 0–0.2)
OPIATES UR QL: NEGATIVE
OXYCODONE UR QL SCN: NEGATIVE
PH UR STRIP.AUTO: 5.5 [PH] (ref 5–8)
PLATELET # BLD AUTO: 412 10*3/MM3 (ref 140–450)
PMV BLD AUTO: 6.6 FL (ref 6–12)
POTASSIUM BLD-SCNC: 3.3 MMOL/L (ref 3.5–5.2)
PROT SERPL-MCNC: 7.3 G/DL (ref 6–8.5)
PROT UR QL STRIP: NEGATIVE
PROTHROMBIN TIME: 10.7 SECONDS (ref 9.6–11.7)
RBC # BLD AUTO: 4.06 10*6/MM3 (ref 3.77–5.28)
SODIUM BLD-SCNC: 121 MMOL/L (ref 136–145)
SP GR UR STRIP: 1.02 (ref 1–1.03)
UROBILINOGEN UR QL STRIP: NORMAL
WBC NRBC COR # BLD: 16.7 10*3/MM3 (ref 3.4–10.8)

## 2020-06-01 PROCEDURE — 80307 DRUG TEST PRSMV CHEM ANLYZR: CPT | Performed by: NURSE PRACTITIONER

## 2020-06-01 PROCEDURE — 85025 COMPLETE CBC W/AUTO DIFF WBC: CPT | Performed by: NURSE PRACTITIONER

## 2020-06-01 PROCEDURE — 93005 ELECTROCARDIOGRAM TRACING: CPT | Performed by: NURSE PRACTITIONER

## 2020-06-01 PROCEDURE — 99219 PR INITIAL OBSERVATION CARE/DAY 50 MINUTES: CPT | Performed by: NURSE PRACTITIONER

## 2020-06-01 PROCEDURE — 82140 ASSAY OF AMMONIA: CPT | Performed by: NURSE PRACTITIONER

## 2020-06-01 PROCEDURE — 96365 THER/PROPH/DIAG IV INF INIT: CPT

## 2020-06-01 PROCEDURE — 85610 PROTHROMBIN TIME: CPT | Performed by: NURSE PRACTITIONER

## 2020-06-01 PROCEDURE — 25010000003 POTASSIUM CHLORIDE 10 MEQ/100ML SOLUTION: Performed by: NURSE PRACTITIONER

## 2020-06-01 PROCEDURE — 83735 ASSAY OF MAGNESIUM: CPT | Performed by: NURSE PRACTITIONER

## 2020-06-01 PROCEDURE — 80053 COMPREHEN METABOLIC PANEL: CPT | Performed by: NURSE PRACTITIONER

## 2020-06-01 PROCEDURE — P9612 CATHETERIZE FOR URINE SPEC: HCPCS

## 2020-06-01 PROCEDURE — 99285 EMERGENCY DEPT VISIT HI MDM: CPT

## 2020-06-01 PROCEDURE — 81003 URINALYSIS AUTO W/O SCOPE: CPT | Performed by: NURSE PRACTITIONER

## 2020-06-01 PROCEDURE — 25010000002 MAGNESIUM SULFATE 2 GM/50ML SOLUTION: Performed by: NURSE PRACTITIONER

## 2020-06-01 PROCEDURE — 70450 CT HEAD/BRAIN W/O DYE: CPT

## 2020-06-01 PROCEDURE — 96367 TX/PROPH/DG ADDL SEQ IV INF: CPT

## 2020-06-01 RX ORDER — ONDANSETRON 2 MG/ML
4 INJECTION INTRAMUSCULAR; INTRAVENOUS EVERY 6 HOURS PRN
Status: DISCONTINUED | OUTPATIENT
Start: 2020-06-01 | End: 2020-06-02 | Stop reason: HOSPADM

## 2020-06-01 RX ORDER — AMLODIPINE BESYLATE 5 MG/1
5 TABLET ORAL ONCE
Status: COMPLETED | OUTPATIENT
Start: 2020-06-01 | End: 2020-06-01

## 2020-06-01 RX ORDER — SODIUM CHLORIDE 0.9 % (FLUSH) 0.9 %
10 SYRINGE (ML) INJECTION EVERY 12 HOURS SCHEDULED
Status: DISCONTINUED | OUTPATIENT
Start: 2020-06-01 | End: 2020-06-02 | Stop reason: HOSPADM

## 2020-06-01 RX ORDER — NITROGLYCERIN 0.4 MG/1
0.4 TABLET SUBLINGUAL
Status: DISCONTINUED | OUTPATIENT
Start: 2020-06-01 | End: 2020-06-02 | Stop reason: HOSPADM

## 2020-06-01 RX ORDER — ONDANSETRON 4 MG/1
4 TABLET, FILM COATED ORAL EVERY 6 HOURS PRN
Status: DISCONTINUED | OUTPATIENT
Start: 2020-06-01 | End: 2020-06-02 | Stop reason: HOSPADM

## 2020-06-01 RX ORDER — SODIUM CHLORIDE, SODIUM LACTATE, POTASSIUM CHLORIDE, CALCIUM CHLORIDE 600; 310; 30; 20 MG/100ML; MG/100ML; MG/100ML; MG/100ML
100 INJECTION, SOLUTION INTRAVENOUS CONTINUOUS
Status: DISCONTINUED | OUTPATIENT
Start: 2020-06-01 | End: 2020-06-02

## 2020-06-01 RX ORDER — ACETAMINOPHEN 325 MG/1
650 TABLET ORAL EVERY 4 HOURS PRN
Status: DISCONTINUED | OUTPATIENT
Start: 2020-06-01 | End: 2020-06-02 | Stop reason: HOSPADM

## 2020-06-01 RX ORDER — POTASSIUM CHLORIDE 7.45 MG/ML
10 INJECTION INTRAVENOUS
Status: DISCONTINUED | OUTPATIENT
Start: 2020-06-01 | End: 2020-06-02 | Stop reason: HOSPADM

## 2020-06-01 RX ORDER — MAGNESIUM SULFATE 1 G/100ML
1 INJECTION INTRAVENOUS AS NEEDED
Status: DISCONTINUED | OUTPATIENT
Start: 2020-06-01 | End: 2020-06-02 | Stop reason: HOSPADM

## 2020-06-01 RX ORDER — SODIUM CHLORIDE 0.9 % (FLUSH) 0.9 %
10 SYRINGE (ML) INJECTION AS NEEDED
Status: DISCONTINUED | OUTPATIENT
Start: 2020-06-01 | End: 2020-06-02 | Stop reason: HOSPADM

## 2020-06-01 RX ORDER — ACETAMINOPHEN 160 MG/5ML
650 SOLUTION ORAL EVERY 4 HOURS PRN
Status: DISCONTINUED | OUTPATIENT
Start: 2020-06-01 | End: 2020-06-02 | Stop reason: HOSPADM

## 2020-06-01 RX ORDER — ACETAMINOPHEN 650 MG/1
650 SUPPOSITORY RECTAL EVERY 4 HOURS PRN
Status: DISCONTINUED | OUTPATIENT
Start: 2020-06-01 | End: 2020-06-02 | Stop reason: HOSPADM

## 2020-06-01 RX ORDER — MAGNESIUM SULFATE HEPTAHYDRATE 40 MG/ML
2 INJECTION, SOLUTION INTRAVENOUS AS NEEDED
Status: DISCONTINUED | OUTPATIENT
Start: 2020-06-01 | End: 2020-06-02 | Stop reason: HOSPADM

## 2020-06-01 RX ADMIN — SODIUM CHLORIDE 1000 ML: 900 INJECTION, SOLUTION INTRAVENOUS at 22:41

## 2020-06-01 RX ADMIN — AMLODIPINE BESYLATE 5 MG: 5 TABLET ORAL at 23:26

## 2020-06-01 RX ADMIN — MAGNESIUM SULFATE HEPTAHYDRATE 2 G: 40 INJECTION, SOLUTION INTRAVENOUS at 22:49

## 2020-06-01 RX ADMIN — POTASSIUM CHLORIDE 10 MEQ: 7.46 INJECTION, SOLUTION INTRAVENOUS at 22:41

## 2020-06-02 VITALS
HEART RATE: 97 BPM | TEMPERATURE: 98.3 F | OXYGEN SATURATION: 94 % | DIASTOLIC BLOOD PRESSURE: 73 MMHG | WEIGHT: 194.67 LBS | SYSTOLIC BLOOD PRESSURE: 120 MMHG | RESPIRATION RATE: 18 BRPM | BODY MASS INDEX: 30.55 KG/M2 | HEIGHT: 67 IN

## 2020-06-02 PROBLEM — E87.1 HYPONATREMIA: Status: RESOLVED | Noted: 2020-06-02 | Resolved: 2020-06-02

## 2020-06-02 PROBLEM — E83.42 HYPOMAGNESEMIA: Status: ACTIVE | Noted: 2020-06-02

## 2020-06-02 PROBLEM — R41.82 ALTERED MENTAL STATUS: Status: RESOLVED | Noted: 2020-06-01 | Resolved: 2020-06-02

## 2020-06-02 PROBLEM — E87.1 HYPONATREMIA: Status: ACTIVE | Noted: 2020-06-02

## 2020-06-02 LAB
ALBUMIN SERPL-MCNC: 4.2 G/DL (ref 3.5–5.2)
ALBUMIN/GLOB SERPL: 1.6 G/DL
ALP SERPL-CCNC: 82 U/L (ref 39–117)
ALT SERPL W P-5'-P-CCNC: 13 U/L (ref 1–33)
ANION GAP SERPL CALCULATED.3IONS-SCNC: 8 MMOL/L (ref 5–15)
AST SERPL-CCNC: 19 U/L (ref 1–32)
BASOPHILS # BLD AUTO: 0 10*3/MM3 (ref 0–0.2)
BASOPHILS NFR BLD AUTO: 0.1 % (ref 0–1.5)
BILIRUB SERPL-MCNC: 0.4 MG/DL (ref 0.2–1.2)
BILIRUB UR QL STRIP: NEGATIVE
BUN BLD-MCNC: 3 MG/DL (ref 6–20)
BUN/CREAT SERPL: 4.5 (ref 7–25)
CALCIUM SPEC-SCNC: 8.5 MG/DL (ref 8.6–10.5)
CHLORIDE SERPL-SCNC: 111 MMOL/L (ref 98–107)
CLARITY UR: CLEAR
CO2 SERPL-SCNC: 23 MMOL/L (ref 22–29)
COLOR UR: YELLOW
CREAT BLD-MCNC: 0.67 MG/DL (ref 0.57–1)
DEPRECATED RDW RBC AUTO: 44.6 FL (ref 37–54)
EOSINOPHIL # BLD AUTO: 0 10*3/MM3 (ref 0–0.4)
EOSINOPHIL NFR BLD AUTO: 0.3 % (ref 0.3–6.2)
ERYTHROCYTE [DISTWIDTH] IN BLOOD BY AUTOMATED COUNT: 13.9 % (ref 12.3–15.4)
GFR SERPL CREATININE-BSD FRML MDRD: 94 ML/MIN/1.73
GLOBULIN UR ELPH-MCNC: 2.6 GM/DL
GLUCOSE BLD-MCNC: 126 MG/DL (ref 65–99)
GLUCOSE UR STRIP-MCNC: NEGATIVE MG/DL
HCT VFR BLD AUTO: 39.8 % (ref 34–46.6)
HGB BLD-MCNC: 13.9 G/DL (ref 12–15.9)
HGB UR QL STRIP.AUTO: NEGATIVE
KETONES UR QL STRIP: NEGATIVE
LEUKOCYTE ESTERASE UR QL STRIP.AUTO: NEGATIVE
LYMPHOCYTES # BLD AUTO: 2.2 10*3/MM3 (ref 0.7–3.1)
LYMPHOCYTES NFR BLD AUTO: 20.6 % (ref 19.6–45.3)
MAGNESIUM SERPL-MCNC: 2.2 MG/DL (ref 1.6–2.6)
MCH RBC QN AUTO: 31.9 PG (ref 26.6–33)
MCHC RBC AUTO-ENTMCNC: 35 G/DL (ref 31.5–35.7)
MCV RBC AUTO: 91.2 FL (ref 79–97)
MONOCYTES # BLD AUTO: 0.6 10*3/MM3 (ref 0.1–0.9)
MONOCYTES NFR BLD AUTO: 5.4 % (ref 5–12)
NEUTROPHILS # BLD AUTO: 8 10*3/MM3 (ref 1.7–7)
NEUTROPHILS NFR BLD AUTO: 73.6 % (ref 42.7–76)
NITRITE UR QL STRIP: NEGATIVE
NRBC BLD AUTO-RTO: 0.1 /100 WBC (ref 0–0.2)
PH UR STRIP.AUTO: 5.5 [PH] (ref 5–8)
PLATELET # BLD AUTO: 398 10*3/MM3 (ref 140–450)
PMV BLD AUTO: 6.4 FL (ref 6–12)
POTASSIUM BLD-SCNC: 4 MMOL/L (ref 3.5–5.2)
PROT SERPL-MCNC: 6.8 G/DL (ref 6–8.5)
PROT UR QL STRIP: NEGATIVE
RBC # BLD AUTO: 4.37 10*6/MM3 (ref 3.77–5.28)
SODIUM BLD-SCNC: 142 MMOL/L (ref 136–145)
SP GR UR STRIP: <=1.005 (ref 1–1.03)
UROBILINOGEN UR QL STRIP: NORMAL
WBC NRBC COR # BLD: 10.8 10*3/MM3 (ref 3.4–10.8)

## 2020-06-02 PROCEDURE — 96361 HYDRATE IV INFUSION ADD-ON: CPT

## 2020-06-02 PROCEDURE — G0378 HOSPITAL OBSERVATION PER HR: HCPCS

## 2020-06-02 PROCEDURE — 81003 URINALYSIS AUTO W/O SCOPE: CPT | Performed by: NURSE PRACTITIONER

## 2020-06-02 PROCEDURE — 80053 COMPREHEN METABOLIC PANEL: CPT | Performed by: NURSE PRACTITIONER

## 2020-06-02 PROCEDURE — 83735 ASSAY OF MAGNESIUM: CPT | Performed by: NURSE PRACTITIONER

## 2020-06-02 PROCEDURE — 85025 COMPLETE CBC W/AUTO DIFF WBC: CPT | Performed by: NURSE PRACTITIONER

## 2020-06-02 PROCEDURE — 99217 PR OBSERVATION CARE DISCHARGE MANAGEMENT: CPT | Performed by: INTERNAL MEDICINE

## 2020-06-02 RX ORDER — MAGNESIUM SULFATE HEPTAHYDRATE 40 MG/ML
4 INJECTION, SOLUTION INTRAVENOUS AS NEEDED
Status: DISCONTINUED | OUTPATIENT
Start: 2020-06-02 | End: 2020-06-02 | Stop reason: HOSPADM

## 2020-06-02 RX ORDER — MIRTAZAPINE 15 MG/1
45 TABLET, FILM COATED ORAL NIGHTLY
Status: DISCONTINUED | OUTPATIENT
Start: 2020-06-02 | End: 2020-06-02 | Stop reason: HOSPADM

## 2020-06-02 RX ORDER — LISINOPRIL 10 MG/1
5 TABLET ORAL DAILY
Qty: 15 TABLET | Refills: 0 | Status: SHIPPED | OUTPATIENT
Start: 2020-06-02 | End: 2020-09-09

## 2020-06-02 RX ORDER — AMLODIPINE BESYLATE 5 MG/1
5 TABLET ORAL
Status: DISCONTINUED | OUTPATIENT
Start: 2020-06-02 | End: 2020-06-02 | Stop reason: HOSPADM

## 2020-06-02 RX ORDER — BUPROPION HYDROCHLORIDE 150 MG/1
300 TABLET ORAL DAILY
Status: DISCONTINUED | OUTPATIENT
Start: 2020-06-02 | End: 2020-06-02 | Stop reason: HOSPADM

## 2020-06-02 RX ORDER — POTASSIUM CHLORIDE 20 MEQ/1
40 TABLET, EXTENDED RELEASE ORAL AS NEEDED
Status: DISCONTINUED | OUTPATIENT
Start: 2020-06-02 | End: 2020-06-02 | Stop reason: HOSPADM

## 2020-06-02 RX ORDER — OLANZAPINE 20 MG/1
30 TABLET ORAL NIGHTLY
Qty: 30 TABLET | Refills: 0 | Status: SHIPPED | OUTPATIENT
Start: 2020-06-02

## 2020-06-02 RX ORDER — MAGNESIUM SULFATE HEPTAHYDRATE 40 MG/ML
2 INJECTION, SOLUTION INTRAVENOUS AS NEEDED
Status: DISCONTINUED | OUTPATIENT
Start: 2020-06-02 | End: 2020-06-02 | Stop reason: HOSPADM

## 2020-06-02 RX ORDER — BUSPIRONE HYDROCHLORIDE 15 MG/1
15 TABLET ORAL 2 TIMES DAILY
Status: DISCONTINUED | OUTPATIENT
Start: 2020-06-02 | End: 2020-06-02 | Stop reason: HOSPADM

## 2020-06-02 RX ORDER — POTASSIUM CHLORIDE 7.45 MG/ML
10 INJECTION INTRAVENOUS
Status: DISCONTINUED | OUTPATIENT
Start: 2020-06-02 | End: 2020-06-02 | Stop reason: HOSPADM

## 2020-06-02 RX ORDER — GABAPENTIN 400 MG/1
800 CAPSULE ORAL 4 TIMES DAILY
Status: DISCONTINUED | OUTPATIENT
Start: 2020-06-02 | End: 2020-06-02 | Stop reason: HOSPADM

## 2020-06-02 RX ORDER — GABAPENTIN 400 MG/1
800 CAPSULE ORAL 4 TIMES DAILY
Status: DISCONTINUED | OUTPATIENT
Start: 2020-06-02 | End: 2020-06-02

## 2020-06-02 RX ORDER — OLANZAPINE 10 MG/1
30 TABLET ORAL NIGHTLY
Status: DISCONTINUED | OUTPATIENT
Start: 2020-06-02 | End: 2020-06-02 | Stop reason: HOSPADM

## 2020-06-02 RX ORDER — POTASSIUM CHLORIDE 1.5 G/1.77G
40 POWDER, FOR SOLUTION ORAL AS NEEDED
Status: DISCONTINUED | OUTPATIENT
Start: 2020-06-02 | End: 2020-06-02 | Stop reason: HOSPADM

## 2020-06-02 RX ORDER — MIRTAZAPINE 45 MG/1
45 TABLET, FILM COATED ORAL NIGHTLY
Qty: 30 TABLET | Refills: 0 | Status: SHIPPED | OUTPATIENT
Start: 2020-06-02 | End: 2020-09-09

## 2020-06-02 RX ORDER — DEXTROSE AND SODIUM CHLORIDE 5; .45 G/100ML; G/100ML
500 INJECTION, SOLUTION INTRAVENOUS ONCE
Status: COMPLETED | OUTPATIENT
Start: 2020-06-02 | End: 2020-06-02

## 2020-06-02 RX ADMIN — GABAPENTIN 800 MG: 400 CAPSULE ORAL at 01:39

## 2020-06-02 RX ADMIN — ACETAMINOPHEN 650 MG: 325 TABLET, FILM COATED ORAL at 15:30

## 2020-06-02 RX ADMIN — BUPROPION HYDROCHLORIDE 300 MG: 150 TABLET, EXTENDED RELEASE ORAL at 08:53

## 2020-06-02 RX ADMIN — DEXTROSE AND SODIUM CHLORIDE 500 ML: 5; 450 INJECTION, SOLUTION INTRAVENOUS at 15:23

## 2020-06-02 RX ADMIN — GABAPENTIN 800 MG: 400 CAPSULE ORAL at 09:02

## 2020-06-02 RX ADMIN — ACETAMINOPHEN 650 MG: 325 TABLET, FILM COATED ORAL at 11:10

## 2020-06-02 RX ADMIN — MIRTAZAPINE 45 MG: 15 TABLET, FILM COATED ORAL at 01:39

## 2020-06-02 RX ADMIN — BUSPIRONE HYDROCHLORIDE 15 MG: 15 TABLET ORAL at 09:02

## 2020-06-02 RX ADMIN — Medication 10 ML: at 01:40

## 2020-06-02 RX ADMIN — AMLODIPINE BESYLATE 5 MG: 5 TABLET ORAL at 08:53

## 2020-06-02 RX ADMIN — GABAPENTIN 800 MG: 400 CAPSULE ORAL at 13:36

## 2020-06-02 RX ADMIN — OLANZAPINE 30 MG: 10 TABLET ORAL at 01:39

## 2020-06-02 RX ADMIN — SODIUM CHLORIDE, SODIUM LACTATE, POTASSIUM CHLORIDE, AND CALCIUM CHLORIDE 100 ML/HR: 600; 310; 30; 20 INJECTION, SOLUTION INTRAVENOUS at 00:16

## 2020-06-02 RX ADMIN — BUSPIRONE HYDROCHLORIDE 15 MG: 15 TABLET ORAL at 01:39

## 2020-06-02 RX ADMIN — Medication 10 ML: at 08:53

## 2020-06-02 NOTE — ED PROVIDER NOTES
"Subjective   49-year-old female presents via POV with no complaints.  Patient's family reports that \"she is not been acting right\" since 7 PM.  Her last known well was 1530, her spouse reported to nursing staff that he was watching a movie in the other room with his daughter and checked on her again at 7 PM and she was disoriented.  Reports that she is had previous episode when her blood pressure was elevated.  Reports that she has had a 2-day history of nausea and vomiting and has not been able to hold down her antihypertensives or antipsychotics.  Patient is able to verify her date of birth and her name.  She is not oriented to time.    1. Location: Unable to relate  2. Quality: Unable to relate  3. Severity: 0 on the FLACC scale  4. Worsening factors: Unable to relate  5. Alleviating factors: Unable to relate  6. Onset: 1530  7. Radiation: Unable to relate  8. Frequency: Unable to relate  9. Co-morbidities: Past Medical History:  No date: Anxiety  No date: Depression  No date: Hypertension  10. Source: Patient and spouse            Review of Systems   Unable to perform ROS: Acuity of condition       Past Medical History:   Diagnosis Date   • Anxiety    • Depression    • Hypertension        No Known Allergies    Past Surgical History:   Procedure Laterality Date   • CHOLECYSTECTOMY         History reviewed. No pertinent family history.    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           Objective   Physical Exam   Constitutional: She appears well-developed and well-nourished. She appears listless. She is easily aroused.  Non-toxic appearance. No " distress.   Patient noted to have decorticate posturing with tremors.   HENT:   Head: Normocephalic and atraumatic. Head is without raccoon's eyes and without Selby's sign.   Right Ear: Hearing, tympanic membrane, external ear and ear canal normal. No drainage. No hemotympanum.   Left Ear: Hearing, tympanic membrane, external ear and ear canal normal. No drainage. No hemotympanum.   Nose: Nose normal. No rhinorrhea.   Mouth/Throat: Uvula is midline, oropharynx is clear and moist and mucous membranes are normal.   Eyes: Pupils are equal, round, and reactive to light. Conjunctivae and EOM are normal.   Neck: Trachea normal, normal range of motion, full passive range of motion without pain and phonation normal. Neck supple. No spinous process tenderness present. No neck rigidity. No erythema and normal range of motion present. No Brudzinski's sign and no Kernig's sign noted.   Cardiovascular: Normal rate, regular rhythm, S1 normal, S2 normal, normal heart sounds, intact distal pulses and normal pulses. Exam reveals no gallop and no friction rub.   No murmur heard.  Pulses:       Radial pulses are 2+ on the right side, and 2+ on the left side.        Dorsalis pedis pulses are 2+ on the right side, and 2+ on the left side.        Posterior tibial pulses are 2+ on the right side, and 2+ on the left side.   Pulmonary/Chest: Effort normal and breath sounds normal. No stridor. No respiratory distress. She has no wheezes. She exhibits no tenderness.   Abdominal: Soft. Bowel sounds are normal. She exhibits no distension. There is no tenderness. There is no guarding.   Musculoskeletal: Normal range of motion.   Neurological: She is easily aroused. She has normal strength and normal reflexes. She appears listless. She is disoriented. She exhibits normal muscle tone. She displays no seizure activity. GCS eye subscore is 3. GCS verbal subscore is 4. GCS motor subscore is 5.   Reflex Scores:       Brachioradialis reflexes are 2+  on the right side and 2+ on the left side.       Patellar reflexes are 2+ on the right side and 2+ on the left side.  Patient is able to state her name and date of birth but is not oriented to time.  She oftentimes closes her eyes and has to be aroused to answer questions.  At times she is aphasic.     Skin: Skin is warm and dry. Capillary refill takes less than 2 seconds. No rash noted. No erythema. No pallor.   Nursing note and vitals reviewed.      Procedures           ED Course  ED Course as of Jun 01 2356   Mon Jun 01, 2020 2136 Sinus tachycardia with LAE rate of 106.  Similar to previous EKG from 11/2/2019 rate of 139 sinus tachycardia with LAE.  Interpreted by Dr. Weiss and reviewed by me.   ECG 12 Lead [AL]      ED Course User Index  [AL] Maame Orona, NP    Ct Head Without Contrast Stroke Protocol    Result Date: 6/1/2020  No acute intracranial abnormality.  Electronically Signed By-Ruby Garcia On:6/1/2020 10:39 PM This report was finalized on [id number] by  Ruby Garcia, .    Medications   sodium chloride 0.9 % flush 10 mL (has no administration in time range)   potassium chloride 10 mEq in 100 mL IVPB (0 mEq Intravenous Stopped 6/1/20 2323)   Magnesium Sulfate 2 gram infusion - Mg less than or equal to 1.5 mg/dL (2 g Intravenous New Bag 6/1/20 2249)     Or   Magnesium Sulfate 1 gram infusion - Mg 1.6-1.9 mg/dL ( Intravenous Not Given:  See Alt 6/1/20 2249)   sodium chloride 0.9 % flush 10 mL (has no administration in time range)   sodium chloride 0.9 % flush 10 mL (has no administration in time range)   ondansetron (ZOFRAN) tablet 4 mg (has no administration in time range)     Or   ondansetron (ZOFRAN) injection 4 mg (has no administration in time range)   nitroglycerin (NITROSTAT) SL tablet 0.4 mg (has no administration in time range)   lactated ringers infusion (has no administration in time range)   acetaminophen (TYLENOL) tablet 650 mg (has no administration in time range)     Or    acetaminophen (TYLENOL) 160 MG/5ML solution 650 mg (has no administration in time range)     Or   acetaminophen (TYLENOL) suppository 650 mg (has no administration in time range)   sodium chloride 0.9 % bolus 1,000 mL (0 mL Intravenous Stopped 6/1/20 2326)   amLODIPine (NORVASC) tablet 5 mg (5 mg Oral Given 6/1/20 2326)     Labs Reviewed   COMPREHENSIVE METABOLIC PANEL - Abnormal; Notable for the following components:       Result Value    Glucose 120 (*)     BUN 3 (*)     Sodium 121 (*)     Potassium 3.3 (*)     Chloride 89 (*)     CO2 17.0 (*)     BUN/Creatinine Ratio 4.0 (*)     All other components within normal limits    Narrative:     GFR Normal >60  Chronic Kidney Disease <60  Kidney Failure <15     URINE DRUG SCREEN - Abnormal; Notable for the following components:    Amphet/Methamphet, Screen Positive (*)     All other components within normal limits    Narrative:     Negative Thresholds For Drugs Screened:     Amphetamines               500 ng/ml   Barbiturates               200 ng/ml   Benzodiazepines            100 ng/ml   Cocaine                    300 ng/ml   Methadone                  300 ng/ml   Opiates                    300 ng/ml   Oxycodone                  100 ng/ml   THC                        50 ng/ml    The Normal Value for all drugs tested is negative. This report includes final unconfirmed screening results to be used for medical treatment purposes only. Unconfirmed results must not be used for non-medical purposes such as employment or legal testing. Clinical consideration should be applied to any drug of abuse test, particulary when unconfirmed results are used.  All urine drugs of abuse requests without chain of custody are for medical screening purposes only.  False positives are possible.     MAGNESIUM - Abnormal; Notable for the following components:    Magnesium 1.3 (*)     All other components within normal limits   CBC WITH AUTO DIFFERENTIAL - Abnormal; Notable for the following  components:    WBC 16.70 (*)     MCHC 36.6 (*)     Neutrophil % 87.3 (*)     Lymphocyte % 7.9 (*)     Monocyte % 4.6 (*)     Eosinophil % 0.1 (*)     Neutrophils, Absolute 14.60 (*)     All other components within normal limits   PROTIME-INR - Normal   AMMONIA - Normal   URINALYSIS W/ CULTURE IF INDICATED - Normal    Narrative:     Urine microscopic not indicated.   ETHANOL    Narrative:     Plasma Ethanol Clinical Symptoms:    ETOH (%)               Clinical Symptom  .01-.05              No apparent influence  .03-.12              Euphoria, Diminished judgment and attention   .09-.25              Impaired comprehension, Muscle incoordination  .18-.30              Confusion, Staggered gait, Slurred speech  .25-.40              Markedly decreased response to stimuli, unable to stand or                        walk, vomitting, sleep or stupor  .35-.50              Comatose, Anesthesia, Subnormal body temperature       CBC AND DIFFERENTIAL    Narrative:     The following orders were created for panel order CBC & Differential.  Procedure                               Abnormality         Status                     ---------                               -----------         ------                     CBC Auto Differential[[id number]]        Abnormal            Final result                 Please view results for these tests on the individual orders.                                            MDM  Number of Diagnoses or Management Options  Altered mental status, unspecified altered mental status type:   Hypertension, unspecified type:   Hypochloremia:   Hypokalemia:   Hypomagnesemia:   Diagnosis management comments: Chart Review: 11/2/2019 patient was seen for altered mental status.  At that time she was thought to have had hypertensive encephalopathy.  Her tox screen at that time was positive for methamphetamines, but her  confirmed her sobriety and she is on buspirone which can be falsely positive.  Comorbidity:  "Past Medical History:  No date: Anxiety  No date: Depression  No date: Hypertension  Imaging: Was interpreted by physician and reviewed by myself:    Patient undressed and placed in gown for exam.  Appropriate PPE worn during patient exam. 49-year-old female presents via POV with no complaints.  Patient's family reports that \"she is not been acting right\" since 7 PM.  Her last known well was 1530, her spouse reported to nursing staff that he was watching a movie in the other room with his daughter and checked on her again at 7 PM and she was disoriented.  Reports that she is had previous episode when her blood pressure was elevated.  Reports that she has had a 2-day history of nausea and vomiting and has not been able to hold down her antihypertensives or antipsychotics.  Patient is able to verify her date of birth and her name.  She is not oriented to time.  IV established and labs obtained.  CT head without obtained.White blood cell count 16,700-this is likely to be related to her excessive vomiting.  Platelets 412 hemoglobin 13.2 hematocrit 36.1 sodium 121 potassium 3.3 chloride 89 CO2 17 BUN 3 creatinine 0.75 glucose 121 L normal saline bolus given.  Magnesium and potassium protocols initiated.  Patient passed swallow study, Norvasc 5 mg p.o. given.  Upon reassessment, patient is alert and oriented x3.,  She is more alert and awake.  She is able to carry conversations without difficulty.  Hospitalist paged for admission.  Spoke with ANNA Sanabria who accepted admission on behalf of Dr. Estrada.    Disposition/Treatment: Discussed results with patient, verbalized understanding.  Discussed reasons to return to the ER, patient verbalized understanding.  Agreeable with plan of care.  Patient was stable upon discharge.               Amount and/or Complexity of Data Reviewed  Clinical lab tests: reviewed  Tests in the radiology section of CPT®: reviewed  Tests in the medicine section of CPT®: reviewed  Decide to obtain " previous medical records or to obtain history from someone other than the patient: yes    Patient Progress  Patient progress: stable      Final diagnoses:   Altered mental status, unspecified altered mental status type   Hypokalemia   Hypomagnesemia   Hypochloremia   Hypertension, unspecified type            Maame Orona, NP  06/01/20 4980

## 2020-06-02 NOTE — ED NOTES
Pt family reports pt has had a stomach bug over the past several days and has not been compliant with her blood pressure medication. Pt family reports at home, pt was unable to answer simple questions. Pt is now aox2. Pt reports her insides feel shaky.  Pt family reports last time that her blood pressure was uncontrolled, she acted the same way.       Giuliana Carrillo, RN  06/01/20 2045       Giuliana Carrillo RN  06/01/20 2049

## 2020-06-02 NOTE — PROGRESS NOTES
Discharge Planning Assessment  [facility]     Patient Name: Kristy Falk  MRN: [id number]  Today's Date: 6/2/2020    Admit Date: 6/1/2020    Discharge Needs Assessment     Row Name 06/02/20 1223       Living Environment    Lives With  spouse    Current Living Arrangements  home/apartment/condo    Primary Care Provided by  self    Provides Primary Care For  no one    Family Caregiver if Needed  spouse    Able to Return to Prior Arrangements  yes       Resource/Environmental Concerns    Resource/Environmental Concerns  none    Transportation Concerns  car, none       Transition Planning    Patient/Family Anticipates Transition to  home with family    Patient/Family Anticipated Services at Transition  none    Transportation Anticipated  family or friend will provide       Discharge Needs Assessment    Readmission Within the Last 30 Days  no previous admission in last 30 days    Concerns to be Addressed  denies needs/concerns at this time    Equipment Currently Used at Home  none    Anticipated Changes Related to Illness  none    Equipment Needed After Discharge  none        Discharge Plan     Row Name 06/02/20 1224       Plan    Plan  Anticipate routine home     Patient/Family in Agreement with Plan  yes    Plan Comments  Called and spoke to patient in room. She lives at home with family, I with ADls, still drives, PCP confirmed, no issues affording medications. Currently denies any d/c needs or concerns.         Expected Discharge Date and Time     Expected Discharge Date Expected Discharge Time    Jun 2, 2020         Demographic Summary     Row Name 06/02/20 1223       General Information    Admission Type  observation    Arrived From  emergency department    Referral Source  admission list    Reason for Consult  discharge planning    Preferred Language  English     Used During This Interaction  no        Functional Status     Row Name 06/02/20 1223       Functional Status    Usual Activity Tolerance   good    Current Activity Tolerance  good       Functional Status, IADL    Medications  independent    Meal Preparation  independent    Housekeeping  independent    Laundry  independent    Shopping  independent            Tracy Pollard RN

## 2020-06-02 NOTE — H&P
"      [facility]      Patient Name: Kristy Falk  : 1970  MRN: [id number]  Primary Care Physician: Elizabeth Goode APRN  Date of admission: 2020    Patient Care Team:  Elizabeth Goode APRN as PCP - General          Subjective   History Present Illness     Chief Complaint:   Chief Complaint   Patient presents with   • Hypertension       Reported confusion         49 year old female with PMH anxiety, mood disorders on multiple psychotropic meds brought to ED by family for concern for \" not making sense\" Family reported several days of vomiting. ED notes pateint was confused in ED which was resolved after  An IVF bolus. Pateint is now awake, alert and oriented x3. She answers questions appropriately. She denies any history of recent vomiting. She has no complaints. She was hypertensive on admission and reports she has not been taking her amlodipine because it make her feel jittery and have palpitations. She was given a home dose of amlodipine in ED with some improvement . Labs show Na 121, , K 3.3, Mg 1.3. K and Mg were replaced in ED. She denies nausea and is currently eating a sandwich. Urine drug screen positive for methamphetamines . She denies use and is on Bupropion which apparently can give a false positive. WBC is elevated at 16.7 with no signs of infection. CT head per radiology was negative for acute process.. She has no focal deficits. Review of records shows she was admitted here in November for hypertensive urgency and altered mental status diagnosed as hypertensive encephalopathy. She is a daily smoker. She will be admitted for electrolyte replacement and BP monitoring , further evaluatin and treatment .      Review of Systems   Constitution: Negative.   HENT: Negative.    Eyes: Negative.    Cardiovascular: Negative.    Respiratory: Negative.    Endocrine: Negative.    Hematologic/Lymphatic: Negative.    Skin: Negative.    Musculoskeletal: Negative.   "   Gastrointestinal: Negative.    Genitourinary: Negative.    Neurological: Negative.    Psychiatric/Behavioral: The patient is nervous/anxious.    Allergic/Immunologic: Negative.            Personal History     Past Medical History:   Past Medical History:   Diagnosis Date   • Anxiety    • Depression    • Hypertension        Surgical History:      Past Surgical History:   Procedure Laterality Date   • CHOLECYSTECTOMY             Family History: family history is not on file. Otherwise pertinent FHx was reviewed and unremarkable.     Social History:  reports that she has been smoking. She has been smoking about 1.50 packs per day. She has never used smokeless tobacco. She reports that she does not drink alcohol or use drugs.      Medications:  Prior to Admission medications    Medication Sig Start Date End Date Taking? Authorizing Provider   amLODIPine (NORVASC) 5 MG tablet Take 1 tablet by mouth Daily. 11/4/19  Yes Gabo Rosales MD   buPROPion XL (WELLBUTRIN XL) 150 MG 24 hr tablet Take 300 mg by mouth Daily.   Yes Darwin Montesinos MD   gabapentin (NEURONTIN) 800 MG tablet Take 800 mg by mouth 4 (Four) Times a Day.   Yes Darwin Montesinos MD   busPIRone (BUSPAR) 15 MG tablet Take 15 mg by mouth 2 (Two) Times a Day.    Darwin Montesinos MD   mirtazapine (REMERON) 45 MG tablet Take 90 mg by mouth Every Night.    Darwin Montesinos MD   OLANZapine (ZYPREXA) 20 MG tablet Take 30 mg by mouth Every Night.    Darwin Montesinos MD       Allergies:  No Known Allergies    Objective   Objective     Vital Signs  Temp:  [97.6 °F (36.4 °C)-99.1 °F (37.3 °C)] 99.1 °F (37.3 °C)  Heart Rate:  [] 90  Resp:  [21] 21  BP: (159-188)/() 159/91  SpO2:  [95 %-100 %] 98 %  on   ;      Body mass index is 31.32 kg/m².    Physical Exam   Constitutional: She is oriented to person, place, and time. She appears well-developed and well-nourished.   HENT:   Head: Normocephalic and atraumatic.   Eyes: EOM  are normal.   Neck: Normal range of motion. Neck supple.   Cardiovascular: Normal rate, regular rhythm and normal heart sounds.   Pulmonary/Chest: Effort normal and breath sounds normal.   Abdominal: Soft. Bowel sounds are normal.   Genitourinary:   Genitourinary Comments: deferred   Musculoskeletal: Normal range of motion.   Neurological: She is alert and oriented to person, place, and time.   Skin: Skin is warm and dry.   Psychiatric: She has a normal mood and affect. Her behavior is normal. Thought content normal.   Vitals reviewed.      Results Review:  I have personally reviewed most recent lab results and agree with findings, most notably: CMP cbc .    Results from last 7 days   Lab Units 06/01/20 2139   WBC 10*3/mm3 16.70*   HEMOGLOBIN g/dL 13.2   HEMATOCRIT % 36.1   PLATELETS 10*3/mm3 412   INR  1.03     Results from last 7 days   Lab Units 06/01/20 2139   SODIUM mmol/L 121*   POTASSIUM mmol/L 3.3*   CHLORIDE mmol/L 89*   CO2 mmol/L 17.0*   BUN mg/dL 3*   CREATININE mg/dL 0.75   GLUCOSE mg/dL 120*   CALCIUM mg/dL 8.6   ALT (SGPT) U/L 19   AST (SGOT) U/L 23     Estimated Creatinine Clearance: 104.9 mL/min (by C-G formula based on SCr of 0.75 mg/dL).  Brief Urine Lab Results  (Last result in the past 365 days)      Color   Clarity   Blood   Leuk Est   Nitrite   Protein   CREAT   Urine HCG        06/01/20 2139 Yellow Clear Negative Negative Negative Negative               Microbiology Results (last 10 days)     ** No results found for the last 240 hours. **          ECG/EMG Results (most recent)     Procedure Component Value Units Date/Time    ECG 12 Lead [[id number]] Collected:  06/01/20 2136     Updated:  06/01/20 2140    Narrative:       HEART RATE= 106  bpm  RR Interval= 568  ms  CT Interval= 142  ms  P Horizontal Axis= 11  deg  P Front Axis= 66  deg  QRSD Interval= 95  ms  QT Interval= 327  ms  QRS Axis= 61  deg  T Wave Axis= 5  deg  - BORDERLINE ECG -  Sinus tachycardia  Left atrial enlargement  When  "compared with ECG of 02-Nov-2019 10:12:27,  Significant rate decrease  Electronically Signed By:   Date and Time of Study: 2020-06-01 21:36:15               Results for orders placed during the hospital encounter of 11/02/19   Adult Transthoracic Echo Complete W/ Cont if Necessary Per Protocol    Narrative · Left ventricular systolic function is normal.  · Moderate aortic valve regurgitation is present.  · Mild mitral valve regurgitation is present  · Mild tricuspid valve regurgitation is present.     Normal LV and RV function and size  Normal atrial sizes  Moderate aortic insufficiency with central coaptation point origin  Mildly elevated PA systolic pressure with mild pulmonary hypertension  Normal diastolic function estimated  Mild MR, mild TR           Ct Head Without Contrast Stroke Protocol    Result Date: 6/1/2020  No acute intracranial abnormality.  Electronically Signed By-Ruby Garcia On:6/1/2020 10:39 PM This report was finalized on [id number] by  Ruby Garcia, .        Estimated Creatinine Clearance: 104.9 mL/min (by C-G formula based on SCr of 0.75 mg/dL).    Assessment/Plan   Assessment/Plan       Active Hospital Problems    Diagnosis  POA   • Hyponatremia [E87.1]  Yes   • Hypomagnesemia [E83.42]  Yes   • Altered mental status [R41.82]  Yes      Resolved Hospital Problems   No resolved problems to display.     Altered mental status - CT head negative , no focal deficits, likely secondary to metabolic or hypertensive encephalopathy and low fluid volumes. CR normal , BUN 3, but resolved after IVF bolus, continue LR at 100 cc. Hr see plans below     Hypertension - elevated on admission - was not taking home amlodipine because makes her feel \" jitter\" and palpitation - home dose given in ED and tolerated Patient has PMH anxiety on many psychotropic meds on Norvasc 5 mg daily     Hyponatremia 121- denies vomiting at home - on high dose 90 mg Remeron po daily ( followed by Dr Alcala) this was reduced " to clinical dose of 45 mg RN to check with Pt pharmacy in am to confirm dose- Remeron per pharmacy consult can cause hyponatremia - LR IVF 1500 cc fluid restrictions and repeat Na in am     Hypomagnesemia Mg 1.3 - replaced in ED- repeat Mg in am - replacement protocol in place prn     Mild hypokalemia 3.3 - replaced in ED repeat K in am replacement protocol in place prn     Anxiety / unknown mood disorder followed by Dr Alcala outpatient on Remeron ( very high dose 90 mg reduced to 45 mg for now, on Zyprexa, Bupropion, Buspirone,  and gabapentin ( INSPECT verified- again very high dose 800 mg po 4x day       Obesity BMI 31- lifestyle management if receptive     Tobacco use- cessation counseling       VTE Prophylaxis - scd  Mechanical Order History:     None      Pharmalogical Order History:     None          CODE STATUS:    Code Status and Medical Interventions:   Ordered at: 06/01/20 3970     Code Status:    CPR     Medical Interventions (Level of Support Prior to Arrest):    Full       This patient has been examined wearing appropriate Personal Protective Equipment  06/02/20      I discussed the patient's findings and my recommendations with patient.        Electronically signed by ANNA Warren, 06/01/20, 11:46 PM.  [facility] Hospitalist Team

## 2020-06-02 NOTE — DISCHARGE SUMMARY
"  Date of Admission: 6/1/2020    Date of Discharge:  6/2/2020    Length of stay:  LOS: 0 days     Presenting Problem:   Hypokalemia [E87.6]  Hypochloremia [E87.8]  Hypomagnesemia [E83.42]  Altered mental status, unspecified altered mental status type [R41.82]  Hypertension, unspecified type [I10]      Principal and Active Diagnosis During Hospital Stay:     Active Hospital Problems    Diagnosis  POA   • Hypomagnesemia [E83.42]  Yes      Resolved Hospital Problems    Diagnosis Date Resolved POA   • Hyponatremia [E87.1] 06/02/2020 Yes   • Altered mental status [R41.82] 06/02/2020 Yes       Metabolic encephalopathy  -Multifactorial to hyponatremia, hypertensive encephalopathy  -Resolved  -Also UDS positive for amphetamines?  -See below     Hypertension - elevated on admission   - was not taking home amlodipine because makes her feel \" jitter\" and palpitation   -medication changed to lisinopril at d/c for better compliance    Hyponatremia   -resolved and may have been slightly overcorrected, was given D5 infusion to augment this  -repeat BMP at primary follow up     Hypomagnesemia   -Replaced and normal at discharge     Mild hypokalemia   -Replaced and normal at discharge     Anxiety / unknown mood disorder  - followed by Dr Alcala outpatient  -Decrease the dose of Remeron  -Continue Zyprexa        Obesity BMI 31- lifestyle management if receptive      Tobacco use- cessation counseling     Hospital Course  Patient is a 49 y.o. female presented with altered mental status.  She was found to have elevated hypertension along with hyponatremia.  She was admitted and both of these resolved and her mental status was improved.  Change mental status was better she was placed on a blood pressure medication which she may be more compliant with.        Procedures Performed:none         Consults:   Consults     No orders found for last 30 day(s).          Pertinent Test Results:     Lab Results (last 72 hours)     Procedure " Component Value Units Date/Time    Magnesium [[id number]]  (Normal) Collected:  06/02/20 0353    Specimen:  Blood Updated:  06/02/20 0457     Magnesium 2.2 mg/dL     Comprehensive Metabolic Panel [[id number]]  (Abnormal) Collected:  06/02/20 0353    Specimen:  Blood Updated:  06/02/20 0457     Glucose 126 mg/dL      BUN 3 mg/dL      Creatinine 0.67 mg/dL      Sodium 142 mmol/L      Potassium 4.0 mmol/L      Chloride 111 mmol/L      CO2 23.0 mmol/L      Calcium 8.5 mg/dL      Total Protein 6.8 g/dL      Albumin 4.20 g/dL      ALT (SGPT) 13 U/L      AST (SGOT) 19 U/L      Alkaline Phosphatase 82 U/L      Total Bilirubin 0.4 mg/dL      eGFR Non African Amer 94 mL/min/1.73      Globulin 2.6 gm/dL      A/G Ratio 1.6 g/dL      BUN/Creatinine Ratio 4.5     Anion Gap 8.0 mmol/L     Narrative:       GFR Normal >60  Chronic Kidney Disease <60  Kidney Failure <15      CBC Auto Differential [[id number]]  (Abnormal) Collected:  06/02/20 0353    Specimen:  Blood Updated:  06/02/20 0438     WBC 10.80 10*3/mm3      RBC 4.37 10*6/mm3      Hemoglobin 13.9 g/dL      Hematocrit 39.8 %      MCV 91.2 fL      MCH 31.9 pg      MCHC 35.0 g/dL      RDW 13.9 %      RDW-SD 44.6 fl      MPV 6.4 fL      Platelets 398 10*3/mm3      Neutrophil % 73.6 %      Lymphocyte % 20.6 %      Monocyte % 5.4 %      Eosinophil % 0.3 %      Basophil % 0.1 %      Neutrophils, Absolute 8.00 10*3/mm3      Lymphocytes, Absolute 2.20 10*3/mm3      Monocytes, Absolute 0.60 10*3/mm3      Eosinophils, Absolute 0.00 10*3/mm3      Basophils, Absolute 0.00 10*3/mm3      nRBC 0.1 /100 WBC     Urinalysis With Microscopic If Indicated (No Culture) - Urine, Clean Catch [[id number]]  (Normal) Collected:  06/02/20 0253    Specimen:  Urine, Clean Catch Updated:  06/02/20 0359     Color, UA Yellow     Appearance, UA Clear     pH, UA 5.5     Specific Gravity, UA <=1.005     Glucose, UA Negative     Ketones, UA Negative     Bilirubin, UA Negative     Blood, UA Negative     Protein, UA  Negative     Leuk Esterase, UA Negative     Nitrite, UA Negative     Urobilinogen, UA 0.2 E.U./dL    Narrative:       Urine microscopic not indicated.    Urine Drug Screen - Urine, Catheter [[id number]]  (Abnormal) Collected:  06/01/20 2138    Specimen:  Urine, Catheter Updated:  06/01/20 2215     Amphet/Methamphet, Screen Positive     Barbiturates Screen, Urine Negative     Benzodiazepine Screen, Urine Negative     Cocaine Screen, Urine Negative     Opiate Screen Negative     THC, Screen, Urine Negative     Methadone Screen, Urine Negative     Oxycodone Screen, Urine Negative    Narrative:       Negative Thresholds For Drugs Screened:     Amphetamines               500 ng/ml   Barbiturates               200 ng/ml   Benzodiazepines            100 ng/ml   Cocaine                    300 ng/ml   Methadone                  300 ng/ml   Opiates                    300 ng/ml   Oxycodone                  100 ng/ml   THC                        50 ng/ml    The Normal Value for all drugs tested is negative. This report includes final unconfirmed screening results to be used for medical treatment purposes only. Unconfirmed results must not be used for non-medical purposes such as employment or legal testing. Clinical consideration should be applied to any drug of abuse test, particulary when unconfirmed results are used.  All urine drugs of abuse requests without chain of custody are for medical screening purposes only.  False positives are possible.      Ammonia [[id number]]  (Normal) Collected:  06/01/20 2139    Specimen:  Blood Updated:  06/01/20 2206     Ammonia 26 umol/L     Comprehensive Metabolic Panel [[id number]]  (Abnormal) Collected:  06/01/20 2139    Specimen:  Blood Updated:  06/01/20 2205     Glucose 120 mg/dL      BUN 3 mg/dL      Creatinine 0.75 mg/dL      Sodium 121 mmol/L      Potassium 3.3 mmol/L      Chloride 89 mmol/L      CO2 17.0 mmol/L      Calcium 8.6 mg/dL      Total Protein 7.3 g/dL      Albumin 4.40 g/dL       ALT (SGPT) 19 U/L      AST (SGOT) 23 U/L      Alkaline Phosphatase 86 U/L      Total Bilirubin 0.4 mg/dL      eGFR Non African Amer 82 mL/min/1.73      Globulin 2.9 gm/dL      A/G Ratio 1.5 g/dL      BUN/Creatinine Ratio 4.0     Anion Gap 15.0 mmol/L     Narrative:       GFR Normal >60  Chronic Kidney Disease <60  Kidney Failure <15      Ethanol [[id number]] Collected:  06/01/20 2139    Specimen:  Blood Updated:  06/01/20 2205     Ethanol % 0.015 %     Narrative:       Plasma Ethanol Clinical Symptoms:    ETOH (%)               Clinical Symptom  .01-.05              No apparent influence  .03-.12              Euphoria, Diminished judgment and attention   .09-.25              Impaired comprehension, Muscle incoordination  .18-.30              Confusion, Staggered gait, Slurred speech  .25-.40              Markedly decreased response to stimuli, unable to stand or                        walk, vomitting, sleep or stupor  .35-.50              Comatose, Anesthesia, Subnormal body temperature        Magnesium [[id number]]  (Abnormal) Collected:  06/01/20 2139    Specimen:  Blood Updated:  06/01/20 2205     Magnesium 1.3 mg/dL     Protime-INR [[id number]]  (Normal) Collected:  06/01/20 2139    Specimen:  Blood Updated:  06/01/20 2157     Protime 10.7 Seconds      INR 1.03    Urinalysis With Culture If Indicated - Urine, Catheter In/Out [[id number]]  (Normal) Collected:  06/01/20 2139    Specimen:  Urine, Catheter In/Out Updated:  06/01/20 2153     Color, UA Yellow     Appearance, UA Clear     pH, UA 5.5     Specific Gravity, UA 1.020     Glucose, UA Negative     Ketones, UA Negative     Bilirubin, UA Negative     Blood, UA Negative     Protein, UA Negative     Leuk Esterase, UA Negative     Nitrite, UA Negative     Urobilinogen, UA 0.2 E.U./dL    Narrative:       Urine microscopic not indicated.    CBC & Differential [[id number]] Collected:  06/01/20 2139    Specimen:  Blood Updated:  06/01/20 2148    Narrative:       The  following orders were created for panel order CBC & Differential.  Procedure                               Abnormality         Status                     ---------                               -----------         ------                     CBC Auto Differential[[id number]]        Abnormal            Final result                 Please view results for these tests on the individual orders.    CBC Auto Differential [[id number]]  (Abnormal) Collected:  06/01/20 2139    Specimen:  Blood Updated:  06/01/20 2148     WBC 16.70 10*3/mm3      RBC 4.06 10*6/mm3      Hemoglobin 13.2 g/dL      Hematocrit 36.1 %      MCV 89.1 fL      MCH 32.6 pg      MCHC 36.6 g/dL      RDW 13.5 %      RDW-SD 42.4 fl      MPV 6.6 fL      Platelets 412 10*3/mm3      Neutrophil % 87.3 %      Lymphocyte % 7.9 %      Monocyte % 4.6 %      Eosinophil % 0.1 %      Basophil % 0.1 %      Neutrophils, Absolute 14.60 10*3/mm3      Lymphocytes, Absolute 1.30 10*3/mm3      Monocytes, Absolute 0.80 10*3/mm3      Eosinophils, Absolute 0.00 10*3/mm3      Basophils, Absolute 0.00 10*3/mm3      nRBC 0.0 /100 WBC                Microbiology Results (last 10 days)     ** No results found for the last 240 hours. **            Results for orders placed during the hospital encounter of 11/02/19   Adult Transthoracic Echo Complete W/ Cont if Necessary Per Protocol    Narrative · Left ventricular systolic function is normal.  · Moderate aortic valve regurgitation is present.  · Mild mitral valve regurgitation is present  · Mild tricuspid valve regurgitation is present.     Normal LV and RV function and size  Normal atrial sizes  Moderate aortic insufficiency with central coaptation point origin  Mildly elevated PA systolic pressure with mild pulmonary hypertension  Normal diastolic function estimated  Mild MR, mild TR           Imaging Results (All)     Procedure Component Value Units Date/Time    CT Head Without Contrast Stroke Protocol [[id number]] Collected:  06/01/20  2237     Updated:  06/01/20 2241    Narrative:       CT HEAD WO CONTRAST STROKE PROTOCOL-     Date of Exam: 6/1/2020 10:05 PM     Indication: confusion, hypertensive, [id number].       Comparison: CT head without contrast 11/02/2019.     Technique:  Without contrast, contiguous axial CT images of the head  were obtained from skull base to vertex.   Automated exposure control  and iterative reconstruction methods were used.     FINDINGS:  No evidence of intracranial hemorrhage, mass, or midline shift.  The  ventricular system is nondilated. The gray white matter differentiation  is preserved. Osseous structures are intact. The included mastoid air  cells and paranasal sinuses are clear.         Impression:       No acute intracranial abnormality.     Electronically Signed By-Ruby Garcia On:6/1/2020 10:39 PM  This report was finalized on [id number] by  Ruby Garcia, .            Condition on Discharge:  Stable     Vital Signs  Temp:  [97.6 °F (36.4 °C)-99.2 °F (37.3 °C)] 98.3 °F (36.8 °C)  Heart Rate:  [] 97  Resp:  [17-21] 18  BP: (120-188)/() 120/73    Physical Exam:  Physical Exam   Constitutional: She is oriented to person, place, and time. No distress.   HENT:   Head: Normocephalic.   Cardiovascular: Normal rate and regular rhythm.   Pulmonary/Chest: Effort normal. No respiratory distress.   Abdominal: Soft. She exhibits no distension.   Neurological: She is alert and oriented to person, place, and time.   Skin: Skin is warm.       Discharge Disposition  Home or Self Care    Discharge Medications     Discharge Medications      New Medications      Instructions Start Date   lisinopril 10 MG tablet  Commonly known as:  PRINIVIL,ZESTRIL   5 mg, Oral, Daily         Changes to Medications      Instructions Start Date   mirtazapine 45 MG tablet  Commonly known as:  REMERON  What changed:  Another medication with the same name was added. Make sure you understand how and when to take each.   90 mg, Oral,  Nightly      mirtazapine 45 MG tablet  Commonly known as:  REMERON  What changed:  You were already taking a medication with the same name, and this prescription was added. Make sure you understand how and when to take each.   45 mg, Oral, Nightly         Continue These Medications      Instructions Start Date   buPROPion  MG 24 hr tablet  Commonly known as:  WELLBUTRIN XL   300 mg, Oral, Daily      busPIRone 15 MG tablet  Commonly known as:  BUSPAR   15 mg, Oral, 4 Times Daily PRN      gabapentin 800 MG tablet  Commonly known as:  NEURONTIN   800 mg, Oral, 4 Times Daily      OLANZapine 20 MG tablet  Commonly known as:  ZyPREXA   30 mg, Oral, Nightly             Discharge Diet:   Diet Instructions     Diet: Regular      Discharge Diet:  Regular          Activity at Discharge:     Follow-up Appointments  No future appointments.  Additional Instructions for the Follow-ups that You Need to Schedule     Discharge Follow-up with PCP   As directed       Currently Documented PCP:    Elizabeth Goode APRN    PCP Phone Number:    [phone]     Follow Up Details:  one week               Test Results Pending at Discharge       Risk for Readmission (LACE) Score: 1 (6/2/2020  6:00 AM)      This patient has been examined wearing appropriate Personal Protective Equipment. 06/02/20        Esequiel Estrada DO  06/02/20  14:58

## 2020-06-02 NOTE — PLAN OF CARE
Problem: Patient Care Overview  Goal: Plan of Care Review  Outcome: Ongoing (interventions implemented as appropriate)  Flowsheets (Taken 6/2/2020 0301)  Progress: improving  Plan of Care Reviewed With: patient   Patient alert and oriented, patient on Ivf's-Lr@100, patient has rested well through the night, will continue to monitor.

## 2020-06-24 ENCOUNTER — APPOINTMENT (OUTPATIENT)
Dept: GENERAL RADIOLOGY | Facility: HOSPITAL | Age: 50
End: 2020-06-24

## 2020-06-24 ENCOUNTER — HOSPITAL ENCOUNTER (EMERGENCY)
Facility: HOSPITAL | Age: 50
Discharge: HOME | End: 2020-06-24
Attending: EMERGENCY MEDICINE | Admitting: EMERGENCY MEDICINE

## 2020-06-24 ENCOUNTER — APPOINTMENT (OUTPATIENT)
Dept: CT IMAGING | Facility: HOSPITAL | Age: 50
End: 2020-06-24

## 2020-06-24 VITALS
RESPIRATION RATE: 16 BRPM | OXYGEN SATURATION: 96 % | HEIGHT: 67 IN | TEMPERATURE: 97.5 F | HEART RATE: 99 BPM | BODY MASS INDEX: 29.65 KG/M2 | DIASTOLIC BLOOD PRESSURE: 77 MMHG | WEIGHT: 188.93 LBS | SYSTOLIC BLOOD PRESSURE: 146 MMHG

## 2020-06-24 DIAGNOSIS — R41.82 ALTERED MENTAL STATUS, UNSPECIFIED ALTERED MENTAL STATUS TYPE: Primary | ICD-10-CM

## 2020-06-24 LAB
ALBUMIN SERPL-MCNC: 5 G/DL (ref 3.5–5.2)
ALBUMIN/GLOB SERPL: 1.6 G/DL
ALP SERPL-CCNC: 98 U/L (ref 39–117)
ALT SERPL W P-5'-P-CCNC: 17 U/L (ref 1–33)
AMMONIA BLD-SCNC: 15 UMOL/L (ref 11–51)
AMPHET+METHAMPHET UR QL: NEGATIVE
ANION GAP SERPL CALCULATED.3IONS-SCNC: 15 MMOL/L (ref 5–15)
AST SERPL-CCNC: 15 U/L (ref 1–32)
BARBITURATES UR QL SCN: NEGATIVE
BASOPHILS # BLD AUTO: 0.1 10*3/MM3 (ref 0–0.2)
BASOPHILS NFR BLD AUTO: 1.2 % (ref 0–1.5)
BENZODIAZ UR QL SCN: NEGATIVE
BILIRUB SERPL-MCNC: 0.3 MG/DL (ref 0.2–1.2)
BILIRUB UR QL STRIP: NEGATIVE
BUN BLD-MCNC: 4 MG/DL (ref 6–20)
BUN BLD-MCNC: ABNORMAL MG/DL
BUN/CREAT SERPL: ABNORMAL
CALCIUM SPEC-SCNC: 10.5 MG/DL (ref 8.6–10.5)
CANNABINOIDS SERPL QL: NEGATIVE
CHLORIDE SERPL-SCNC: 98 MMOL/L (ref 98–107)
CLARITY UR: CLEAR
CO2 SERPL-SCNC: 24 MMOL/L (ref 22–29)
COCAINE UR QL: NEGATIVE
COLOR UR: YELLOW
CREAT BLD-MCNC: 1.14 MG/DL (ref 0.57–1)
DEPRECATED RDW RBC AUTO: 44.2 FL (ref 37–54)
EOSINOPHIL # BLD AUTO: 0.1 10*3/MM3 (ref 0–0.4)
EOSINOPHIL NFR BLD AUTO: 0.9 % (ref 0.3–6.2)
ERYTHROCYTE [DISTWIDTH] IN BLOOD BY AUTOMATED COUNT: 13.8 % (ref 12.3–15.4)
ETHANOL UR QL: <0.01 %
GFR SERPL CREATININE-BSD FRML MDRD: 50 ML/MIN/1.73
GLOBULIN UR ELPH-MCNC: 3.1 GM/DL
GLUCOSE BLD-MCNC: 113 MG/DL (ref 65–99)
GLUCOSE UR STRIP-MCNC: NEGATIVE MG/DL
HCT VFR BLD AUTO: 41.4 % (ref 34–46.6)
HGB BLD-MCNC: 15 G/DL (ref 12–15.9)
HGB UR QL STRIP.AUTO: NEGATIVE
KETONES UR QL STRIP: NEGATIVE
LEUKOCYTE ESTERASE UR QL STRIP.AUTO: NEGATIVE
LYMPHOCYTES # BLD AUTO: 3 10*3/MM3 (ref 0.7–3.1)
LYMPHOCYTES NFR BLD AUTO: 28.2 % (ref 19.6–45.3)
MAGNESIUM SERPL-MCNC: 2 MG/DL (ref 1.6–2.6)
MCH RBC QN AUTO: 32.8 PG (ref 26.6–33)
MCHC RBC AUTO-ENTMCNC: 36.2 G/DL (ref 31.5–35.7)
MCV RBC AUTO: 90.7 FL (ref 79–97)
METHADONE UR QL SCN: NEGATIVE
MONOCYTES # BLD AUTO: 0.5 10*3/MM3 (ref 0.1–0.9)
MONOCYTES NFR BLD AUTO: 4.4 % (ref 5–12)
NEUTROPHILS # BLD AUTO: 6.9 10*3/MM3 (ref 1.7–7)
NEUTROPHILS NFR BLD AUTO: 65.3 % (ref 42.7–76)
NITRITE UR QL STRIP: NEGATIVE
NRBC BLD AUTO-RTO: 0 /100 WBC (ref 0–0.2)
OPIATES UR QL: NEGATIVE
OXYCODONE UR QL SCN: NEGATIVE
PH UR STRIP.AUTO: 6 [PH] (ref 5–8)
PLATELET # BLD AUTO: 441 10*3/MM3 (ref 140–450)
PMV BLD AUTO: 6.2 FL (ref 6–12)
POTASSIUM BLD-SCNC: 3.8 MMOL/L (ref 3.5–5.2)
PROT SERPL-MCNC: 8.1 G/DL (ref 6–8.5)
PROT UR QL STRIP: NEGATIVE
RBC # BLD AUTO: 4.56 10*6/MM3 (ref 3.77–5.28)
SODIUM BLD-SCNC: 137 MMOL/L (ref 136–145)
SP GR UR STRIP: <=1.005 (ref 1–1.03)
TROPONIN T SERPL-MCNC: <0.01 NG/ML (ref 0–0.03)
UROBILINOGEN UR QL STRIP: NORMAL
WBC NRBC COR # BLD: 10.6 10*3/MM3 (ref 3.4–10.8)
WHOLE BLOOD HOLD SPECIMEN: NORMAL

## 2020-06-24 PROCEDURE — 80307 DRUG TEST PRSMV CHEM ANLYZR: CPT | Performed by: EMERGENCY MEDICINE

## 2020-06-24 PROCEDURE — 82140 ASSAY OF AMMONIA: CPT | Performed by: EMERGENCY MEDICINE

## 2020-06-24 PROCEDURE — 81003 URINALYSIS AUTO W/O SCOPE: CPT | Performed by: EMERGENCY MEDICINE

## 2020-06-24 PROCEDURE — 85025 COMPLETE CBC W/AUTO DIFF WBC: CPT | Performed by: EMERGENCY MEDICINE

## 2020-06-24 PROCEDURE — 84484 ASSAY OF TROPONIN QUANT: CPT | Performed by: EMERGENCY MEDICINE

## 2020-06-24 PROCEDURE — 71045 X-RAY EXAM CHEST 1 VIEW: CPT

## 2020-06-24 PROCEDURE — 70450 CT HEAD/BRAIN W/O DYE: CPT

## 2020-06-24 PROCEDURE — 80053 COMPREHEN METABOLIC PANEL: CPT | Performed by: EMERGENCY MEDICINE

## 2020-06-24 PROCEDURE — 93005 ELECTROCARDIOGRAM TRACING: CPT | Performed by: EMERGENCY MEDICINE

## 2020-06-24 PROCEDURE — 83735 ASSAY OF MAGNESIUM: CPT | Performed by: EMERGENCY MEDICINE

## 2020-06-24 PROCEDURE — 99284 EMERGENCY DEPT VISIT MOD MDM: CPT

## 2020-06-24 RX ORDER — SODIUM CHLORIDE 0.9 % (FLUSH) 0.9 %
10 SYRINGE (ML) INJECTION AS NEEDED
Status: DISCONTINUED | OUTPATIENT
Start: 2020-06-24 | End: 2020-06-25 | Stop reason: HOSPADM

## 2020-06-25 NOTE — ED PROVIDER NOTES
Subjective   Chief complaint altered mental status    History of present illness this is a 50-year-old female who reports that she has felt funny and altered since yesterday.  It has been persistent since then worse today.  Feels like that her mind is not working right.  She reports that her  felt like she was confused.  Not able to remember to do things that she normally would.  She denies headache visual change speech difficulty or paralysis.  Denies any injury.  No chest pain neck arm jaw pain or shortness of breath.  No foreign travels no leg pain or swelling.  She did have a recent admission for something similar it was thought to be secondary to a low sodium and elevated blood pressure.  Symptoms have been mild to moderate persistent since yesterday.  Feels like she might be a little bit off balance.  No urinary problems no black or bloody stool.          Review of Systems   Constitutional: Negative for chills and fever.   HENT: Negative for congestion and sinus pressure.    Eyes: Negative for photophobia and visual disturbance.   Respiratory: Negative for chest tightness and shortness of breath.    Cardiovascular: Negative for chest pain and leg swelling.   Gastrointestinal: Negative for abdominal pain and vomiting.   Endocrine: Negative for cold intolerance and heat intolerance.   Genitourinary: Negative for difficulty urinating and dysuria.   Musculoskeletal: Negative for arthralgias and back pain.   Skin: Negative for color change and pallor.   Neurological: Positive for dizziness. Negative for facial asymmetry, speech difficulty and headaches.   Psychiatric/Behavioral: Negative for agitation and behavioral problems.       Past Medical History:   Diagnosis Date   • Anxiety    • Depression    • Hypertension        No Known Allergies    Past Surgical History:   Procedure Laterality Date   • CHOLECYSTECTOMY         No family history on file.    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   • Sexual activity: Defer       Prior to Admission medications    Medication Sig Start Date End Date Taking? Authorizing Provider   buPROPion XL (WELLBUTRIN XL) 150 MG 24 hr tablet Take 300 mg by mouth Daily.    Darwin Montesinos MD   busPIRone (BUSPAR) 15 MG tablet Take 15 mg by mouth 4 (Four) Times a Day As Needed.    Darwin Montesinos MD   gabapentin (NEURONTIN) 800 MG tablet Take 800 mg by mouth 4 (Four) Times a Day.    Darwin Montesinos MD   lisinopril (PRINIVIL,ZESTRIL) 10 MG tablet Take 0.5 tablets by mouth Daily. 6/2/20   Esequiel Estrada DO   mirtazapine (REMERON) 45 MG tablet Take 90 mg by mouth Every Night.    Darwin Montesinos MD   mirtazapine (REMERON) 45 MG tablet Take 1 tablet by mouth Every Night. 6/2/20   Esequiel Estrada DO   OLANZapine (ZyPREXA) 20 MG tablet Take 1.5 tablets by mouth Every Night. 6/2/20   Esequiel Estrada,          Objective   Physical Exam  50-year-old awake alert no acute distress HEENT extraocular muscles intact pupils equal react there is no photophobia no nystagmus and disc are sharp mouth is clear neck supple no adenopathy no JVD no bruits lungs clear no retractions heart regular without murmur abdomen soft without tenderness good bowel sounds no pulsatile masses extremities pulses are equal throughout upper and lower extremities no edema cords or Homans sign evidence of DVT.  She is awake alert orientated x4 no facial asymmetry normal speech the patient has normal visual fields to confrontation.  The patient tongue midline uvula midline soft palate rises normally normal  finger-to-nose she walks without difficulty there is no ataxia.  NIH currently 0  Procedures           ED Course      Results for orders placed or performed during the hospital encounter of 06/24/20   Comprehensive Metabolic Panel   Result Value Ref Range    Glucose 113 (H) 65 - 99 mg/dL    BUN      Creatinine 1.14 (H) 0.57 - 1.00 mg/dL    Sodium 137 136 - 145 mmol/L    Potassium 3.8 3.5 - 5.2 mmol/L    Chloride 98 98 - 107 mmol/L    CO2 24.0 22.0 - 29.0 mmol/L    Calcium 10.5 8.6 - 10.5 mg/dL    Total Protein 8.1 6.0 - 8.5 g/dL    Albumin 5.00 3.50 - 5.20 g/dL    ALT (SGPT) 17 1 - 33 U/L    AST (SGOT) 15 1 - 32 U/L    Alkaline Phosphatase 98 39 - 117 U/L    Total Bilirubin 0.3 0.2 - 1.2 mg/dL    eGFR Non African Amer 50 (L) >60 mL/min/1.73    Globulin 3.1 gm/dL    A/G Ratio 1.6 g/dL    BUN/Creatinine Ratio      Anion Gap 15.0 5.0 - 15.0 mmol/L   Urinalysis With Culture If Indicated - Urine, Clean Catch   Result Value Ref Range    Color, UA Yellow Yellow, Straw    Appearance, UA Clear Clear    pH, UA 6.0 5.0 - 8.0    Specific Gravity, UA <=1.005 1.005 - 1.030    Glucose, UA Negative Negative    Ketones, UA Negative Negative    Bilirubin, UA Negative Negative    Blood, UA Negative Negative    Protein, UA Negative Negative    Leuk Esterase, UA Negative Negative    Nitrite, UA Negative Negative    Urobilinogen, UA 0.2 E.U./dL 0.2 - 1.0 E.U./dL   Troponin   Result Value Ref Range    Troponin T <0.010 0.000 - 0.030 ng/mL   Ethanol   Result Value Ref Range    Ethanol % <0.010 %   Urine Drug Screen - Urine, Clean Catch   Result Value Ref Range    Amphet/Methamphet, Screen Negative Negative    Barbiturates Screen, Urine Negative Negative    Benzodiazepine Screen, Urine Negative Negative    Cocaine Screen, Urine Negative Negative    Opiate Screen Negative Negative    THC, Screen, Urine Negative Negative    Methadone Screen, Urine Negative Negative    Oxycodone Screen, Urine Negative Negative   Magnesium   Result Value Ref  Range    Magnesium 2.0 1.6 - 2.6 mg/dL   Ammonia   Result Value Ref Range    Ammonia 15 11 - 51 umol/L   CBC Auto Differential   Result Value Ref Range    WBC 10.60 3.40 - 10.80 10*3/mm3    RBC 4.56 3.77 - 5.28 10*6/mm3    Hemoglobin 15.0 12.0 - 15.9 g/dL    Hematocrit 41.4 34.0 - 46.6 %    MCV 90.7 79.0 - 97.0 fL    MCH 32.8 26.6 - 33.0 pg    MCHC 36.2 (H) 31.5 - 35.7 g/dL    RDW 13.8 12.3 - 15.4 %    RDW-SD 44.2 37.0 - 54.0 fl    MPV 6.2 6.0 - 12.0 fL    Platelets 441 140 - 450 10*3/mm3    Neutrophil % 65.3 42.7 - 76.0 %    Lymphocyte % 28.2 19.6 - 45.3 %    Monocyte % 4.4 (L) 5.0 - 12.0 %    Eosinophil % 0.9 0.3 - 6.2 %    Basophil % 1.2 0.0 - 1.5 %    Neutrophils, Absolute 6.90 1.70 - 7.00 10*3/mm3    Lymphocytes, Absolute 3.00 0.70 - 3.10 10*3/mm3    Monocytes, Absolute 0.50 0.10 - 0.90 10*3/mm3    Eosinophils, Absolute 0.10 0.00 - 0.40 10*3/mm3    Basophils, Absolute 0.10 0.00 - 0.20 10*3/mm3    nRBC 0.0 0.0 - 0.2 /100 WBC   BUN   Result Value Ref Range    BUN 4 (L) 6 - 20 mg/dL   Light Blue Top   Result Value Ref Range    Extra Tube hold for add-on      Ct Head Without Contrast    Result Date: 6/24/2020  No evidence of hemorrhage, mass effect or midline shift. No acute process identified.  Electronically Signed ByGosia Phillips On:6/24/2020 9:22 PM This report was finalized on [id number] by  Devan Phillips, .    Xr Chest 1 View    Result Date: 6/24/2020  No acute cardiopulmonary process.  Electronically Signed ByGosia Phillips On:6/24/2020 9:07 PM This report was finalized on [id number] by  Devan Serey, .    Medications   sodium chloride 0.9 % flush 10 mL (has no administration in time range)     EKG my interpretation normal sinus rhythm rate of 100 some atrial enlargement nonspecific ST changes noted diffusely QTC of 345 but this is no change from her previous EKG abnormal                                       MDM  Number of Diagnoses or Management Options  Altered mental status, unspecified altered mental  status type:   Diagnosis management comments: Medical decision making.  Patient had IV established placed on monitor and had the above exam and evaluation EKG is unchanged CT head without no acute findings chest x-ray negative patient's laboratory evaluation CBC and electrolytes unremarkable today sodium 137 creatinine 1.1 her magnesium troponins are all normal urine was negative drug screen negative.  Patient on repeat examination was resting comfortably she was able to walk without ataxia she is awake alert orientated x4 no facial asymmetry normal speech there is no focal weakness in her extremities.  Her neck is supple there is no evidence of meningitis I see no evidence of an acute stroke by clinical exam.  We did talk about that we talked about admission to the hospital for MRI to rule out a subtle stroke that may be causing the symptoms.  She voiced understanding but she would rather go home we talked about the potential risks and complications.  She voiced understanding and she will follow-up in the office.  She will be discharged home for outpatient management we talked about what to return for      Final diagnoses:   Altered mental status, unspecified altered mental status type            John Robbins MD  06/24/20 [id number]

## 2020-06-25 NOTE — DISCHARGE INSTRUCTIONS
Return for changing her mind about staying in the hospital speech difficulty facial asymmetry unable to walk unable to talk visual changes chest pain shortness of breath passing out worsening symptoms or any other new or worse problems or concerns

## 2020-09-09 ENCOUNTER — HOSPITAL ENCOUNTER (OUTPATIENT)
Facility: HOSPITAL | Age: 50
Setting detail: OBSERVATION
Discharge: HOME | End: 2020-09-11
Attending: EMERGENCY MEDICINE | Admitting: INTERNAL MEDICINE

## 2020-09-09 ENCOUNTER — APPOINTMENT (OUTPATIENT)
Dept: CT IMAGING | Facility: HOSPITAL | Age: 50
End: 2020-09-09

## 2020-09-09 DIAGNOSIS — R56.9 SEIZURE (HCC): Primary | ICD-10-CM

## 2020-09-09 DIAGNOSIS — E87.6 HYPOKALEMIA: ICD-10-CM

## 2020-09-09 PROBLEM — G62.9 PERIPHERAL NEUROPATHY: Chronic | Status: ACTIVE | Noted: 2020-09-09

## 2020-09-09 PROBLEM — F10.21 HISTORY OF ALCOHOLISM (HCC): Chronic | Status: ACTIVE | Noted: 2019-12-06

## 2020-09-09 PROBLEM — F10.21 HISTORY OF ALCOHOLISM (HCC): Status: ACTIVE | Noted: 2019-12-06

## 2020-09-09 PROBLEM — Z72.0 TOBACCO ABUSE: Chronic | Status: ACTIVE | Noted: 2020-09-09

## 2020-09-09 PROBLEM — K58.9 IRRITABLE BOWEL SYNDROME: Status: ACTIVE | Noted: 2020-09-09

## 2020-09-09 PROBLEM — F41.9 ANXIETY DISORDER: Chronic | Status: ACTIVE | Noted: 2020-09-09

## 2020-09-09 PROBLEM — K25.9 MULTIPLE GASTRIC ULCERS: Status: ACTIVE | Noted: 2020-09-09

## 2020-09-09 LAB
ALBUMIN SERPL-MCNC: 4.5 G/DL (ref 3.5–5.2)
ALBUMIN/GLOB SERPL: 1.7 G/DL
ALP SERPL-CCNC: 94 U/L (ref 39–117)
ALT SERPL W P-5'-P-CCNC: 11 U/L (ref 1–33)
AMMONIA BLD-SCNC: 32 UMOL/L (ref 11–51)
AMPHET+METHAMPHET UR QL: NEGATIVE
ANION GAP SERPL CALCULATED.3IONS-SCNC: 14 MMOL/L (ref 5–15)
AST SERPL-CCNC: 10 U/L (ref 1–32)
BARBITURATES UR QL SCN: NEGATIVE
BASOPHILS # BLD AUTO: 0.1 10*3/MM3 (ref 0–0.2)
BASOPHILS NFR BLD AUTO: 0.7 % (ref 0–1.5)
BENZODIAZ UR QL SCN: NEGATIVE
BILIRUB SERPL-MCNC: 0.2 MG/DL (ref 0–1.2)
BUN SERPL-MCNC: <3 MG/DL (ref 6–20)
BUN SERPL-MCNC: ABNORMAL MG/DL
BUN/CREAT SERPL: ABNORMAL
CALCIUM SPEC-SCNC: 9.4 MG/DL (ref 8.6–10.5)
CANNABINOIDS SERPL QL: NEGATIVE
CHLORIDE SERPL-SCNC: 102 MMOL/L (ref 98–107)
CO2 SERPL-SCNC: 23 MMOL/L (ref 22–29)
COCAINE UR QL: NEGATIVE
CREAT SERPL-MCNC: 0.95 MG/DL (ref 0.57–1)
DEPRECATED RDW RBC AUTO: 47.3 FL (ref 37–54)
EOSINOPHIL # BLD AUTO: 0 10*3/MM3 (ref 0–0.4)
EOSINOPHIL NFR BLD AUTO: 0.4 % (ref 0.3–6.2)
ERYTHROCYTE [DISTWIDTH] IN BLOOD BY AUTOMATED COUNT: 14.5 % (ref 12.3–15.4)
ETHANOL UR QL: <0.01 %
GFR SERPL CREATININE-BSD FRML MDRD: 62 ML/MIN/1.73
GLOBULIN UR ELPH-MCNC: 2.7 GM/DL
GLUCOSE BLDC GLUCOMTR-MCNC: 88 MG/DL (ref 70–105)
GLUCOSE SERPL-MCNC: 88 MG/DL (ref 65–99)
HCG SERPL QL: NEGATIVE
HCT VFR BLD AUTO: 40 % (ref 34–46.6)
HGB BLD-MCNC: 13.6 G/DL (ref 12–15.9)
HOLD SPECIMEN: NORMAL
LYMPHOCYTES # BLD AUTO: 1.2 10*3/MM3 (ref 0.7–3.1)
LYMPHOCYTES NFR BLD AUTO: 13.7 % (ref 19.6–45.3)
MAGNESIUM SERPL-MCNC: 1.7 MG/DL (ref 1.6–2.6)
MCH RBC QN AUTO: 31.7 PG (ref 26.6–33)
MCHC RBC AUTO-ENTMCNC: 34.1 G/DL (ref 31.5–35.7)
MCV RBC AUTO: 92.9 FL (ref 79–97)
METHADONE UR QL SCN: NEGATIVE
MONOCYTES # BLD AUTO: 0.3 10*3/MM3 (ref 0.1–0.9)
MONOCYTES NFR BLD AUTO: 3.9 % (ref 5–12)
NEUTROPHILS NFR BLD AUTO: 7.1 10*3/MM3 (ref 1.7–7)
NEUTROPHILS NFR BLD AUTO: 81.3 % (ref 42.7–76)
NRBC BLD AUTO-RTO: 0.1 /100 WBC (ref 0–0.2)
OPIATES UR QL: NEGATIVE
OXYCODONE UR QL SCN: NEGATIVE
PLATELET # BLD AUTO: 417 10*3/MM3 (ref 140–450)
PMV BLD AUTO: 6.7 FL (ref 6–12)
POTASSIUM SERPL-SCNC: 3.2 MMOL/L (ref 3.5–5.2)
PROT SERPL-MCNC: 7.2 G/DL (ref 6–8.5)
RBC # BLD AUTO: 4.3 10*6/MM3 (ref 3.77–5.28)
SODIUM SERPL-SCNC: 139 MMOL/L (ref 136–145)
TSH SERPL DL<=0.05 MIU/L-ACNC: 1.11 UIU/ML (ref 0.27–4.2)
WBC # BLD AUTO: 8.7 10*3/MM3 (ref 3.4–10.8)

## 2020-09-09 PROCEDURE — G0378 HOSPITAL OBSERVATION PER HR: HCPCS

## 2020-09-09 PROCEDURE — 70450 CT HEAD/BRAIN W/O DYE: CPT

## 2020-09-09 PROCEDURE — 80307 DRUG TEST PRSMV CHEM ANLYZR: CPT | Performed by: EMERGENCY MEDICINE

## 2020-09-09 PROCEDURE — 84443 ASSAY THYROID STIM HORMONE: CPT | Performed by: EMERGENCY MEDICINE

## 2020-09-09 PROCEDURE — 82140 ASSAY OF AMMONIA: CPT | Performed by: EMERGENCY MEDICINE

## 2020-09-09 PROCEDURE — 99284 EMERGENCY DEPT VISIT MOD MDM: CPT

## 2020-09-09 PROCEDURE — 96374 THER/PROPH/DIAG INJ IV PUSH: CPT

## 2020-09-09 PROCEDURE — 25010000003 LEVETIRACETAM IN NACL 0.54% 1500 MG/100ML SOLUTION: Performed by: EMERGENCY MEDICINE

## 2020-09-09 PROCEDURE — 99285 EMERGENCY DEPT VISIT HI MDM: CPT

## 2020-09-09 PROCEDURE — 93005 ELECTROCARDIOGRAM TRACING: CPT | Performed by: EMERGENCY MEDICINE

## 2020-09-09 PROCEDURE — 83036 HEMOGLOBIN GLYCOSYLATED A1C: CPT | Performed by: PHYSICIAN ASSISTANT

## 2020-09-09 PROCEDURE — 99219 PR INITIAL OBSERVATION CARE/DAY 50 MINUTES: CPT | Performed by: PHYSICIAN ASSISTANT

## 2020-09-09 PROCEDURE — 85025 COMPLETE CBC W/AUTO DIFF WBC: CPT | Performed by: EMERGENCY MEDICINE

## 2020-09-09 PROCEDURE — 80053 COMPREHEN METABOLIC PANEL: CPT | Performed by: EMERGENCY MEDICINE

## 2020-09-09 PROCEDURE — 82962 GLUCOSE BLOOD TEST: CPT

## 2020-09-09 PROCEDURE — 83735 ASSAY OF MAGNESIUM: CPT | Performed by: EMERGENCY MEDICINE

## 2020-09-09 PROCEDURE — 84703 CHORIONIC GONADOTROPIN ASSAY: CPT | Performed by: EMERGENCY MEDICINE

## 2020-09-09 RX ORDER — SODIUM CHLORIDE 0.9 % (FLUSH) 0.9 %
10 SYRINGE (ML) INJECTION AS NEEDED
Status: DISCONTINUED | OUTPATIENT
Start: 2020-09-09 | End: 2020-09-11 | Stop reason: HOSPADM

## 2020-09-09 RX ORDER — ACETAMINOPHEN 650 MG/1
650 SUPPOSITORY RECTAL EVERY 4 HOURS PRN
Status: DISCONTINUED | OUTPATIENT
Start: 2020-09-09 | End: 2020-09-11 | Stop reason: HOSPADM

## 2020-09-09 RX ORDER — LORAZEPAM 2 MG/ML
1 INJECTION INTRAMUSCULAR ONCE AS NEEDED
Status: DISCONTINUED | OUTPATIENT
Start: 2020-09-09 | End: 2020-09-11 | Stop reason: HOSPADM

## 2020-09-09 RX ORDER — NICOTINE 21 MG/24HR
1 PATCH, TRANSDERMAL 24 HOURS TRANSDERMAL NIGHTLY
Status: DISCONTINUED | OUTPATIENT
Start: 2020-09-09 | End: 2020-09-11 | Stop reason: HOSPADM

## 2020-09-09 RX ORDER — NITROGLYCERIN 0.4 MG/1
0.4 TABLET SUBLINGUAL
Status: DISCONTINUED | OUTPATIENT
Start: 2020-09-09 | End: 2020-09-11 | Stop reason: HOSPADM

## 2020-09-09 RX ORDER — POTASSIUM CHLORIDE 20 MEQ/1
40 TABLET, EXTENDED RELEASE ORAL AS NEEDED
Status: DISCONTINUED | OUTPATIENT
Start: 2020-09-09 | End: 2020-09-11 | Stop reason: HOSPADM

## 2020-09-09 RX ORDER — ONDANSETRON 2 MG/ML
4 INJECTION INTRAMUSCULAR; INTRAVENOUS EVERY 6 HOURS PRN
Status: DISCONTINUED | OUTPATIENT
Start: 2020-09-09 | End: 2020-09-11 | Stop reason: HOSPADM

## 2020-09-09 RX ORDER — ACETAMINOPHEN 160 MG/5ML
650 SOLUTION ORAL EVERY 4 HOURS PRN
Status: DISCONTINUED | OUTPATIENT
Start: 2020-09-09 | End: 2020-09-11 | Stop reason: HOSPADM

## 2020-09-09 RX ORDER — LEVETIRACETAM 15 MG/ML
1500 INJECTION INTRAVASCULAR ONCE
Status: COMPLETED | OUTPATIENT
Start: 2020-09-09 | End: 2020-09-09

## 2020-09-09 RX ORDER — BUPROPION HYDROCHLORIDE 450 MG/1
450 TABLET, FILM COATED, EXTENDED RELEASE ORAL DAILY
COMMUNITY
End: 2020-09-11 | Stop reason: HOSPADM

## 2020-09-09 RX ORDER — MAGNESIUM SULFATE 1 G/100ML
1 INJECTION INTRAVENOUS AS NEEDED
Status: DISCONTINUED | OUTPATIENT
Start: 2020-09-09 | End: 2020-09-11 | Stop reason: HOSPADM

## 2020-09-09 RX ORDER — ALUMINA, MAGNESIA, AND SIMETHICONE 2400; 2400; 240 MG/30ML; MG/30ML; MG/30ML
15 SUSPENSION ORAL EVERY 6 HOURS PRN
Status: DISCONTINUED | OUTPATIENT
Start: 2020-09-09 | End: 2020-09-11 | Stop reason: HOSPADM

## 2020-09-09 RX ORDER — ONDANSETRON 4 MG/1
4 TABLET, FILM COATED ORAL EVERY 6 HOURS PRN
Status: DISCONTINUED | OUTPATIENT
Start: 2020-09-09 | End: 2020-09-11 | Stop reason: HOSPADM

## 2020-09-09 RX ORDER — SODIUM CHLORIDE 0.9 % (FLUSH) 0.9 %
10 SYRINGE (ML) INJECTION EVERY 12 HOURS SCHEDULED
Status: DISCONTINUED | OUTPATIENT
Start: 2020-09-09 | End: 2020-09-11 | Stop reason: HOSPADM

## 2020-09-09 RX ORDER — GABAPENTIN 400 MG/1
800 CAPSULE ORAL 4 TIMES DAILY
Status: DISCONTINUED | OUTPATIENT
Start: 2020-09-09 | End: 2020-09-11 | Stop reason: HOSPADM

## 2020-09-09 RX ORDER — BUPROPION HYDROCHLORIDE 150 MG/1
450 TABLET ORAL DAILY
Status: DISCONTINUED | OUTPATIENT
Start: 2020-09-10 | End: 2020-09-10

## 2020-09-09 RX ORDER — BISACODYL 10 MG
10 SUPPOSITORY, RECTAL RECTAL DAILY PRN
Status: DISCONTINUED | OUTPATIENT
Start: 2020-09-09 | End: 2020-09-11 | Stop reason: HOSPADM

## 2020-09-09 RX ORDER — POTASSIUM CHLORIDE 20 MEQ/1
20 TABLET, EXTENDED RELEASE ORAL ONCE
Status: COMPLETED | OUTPATIENT
Start: 2020-09-09 | End: 2020-09-09

## 2020-09-09 RX ORDER — ACETAMINOPHEN 325 MG/1
650 TABLET ORAL EVERY 4 HOURS PRN
Status: DISCONTINUED | OUTPATIENT
Start: 2020-09-09 | End: 2020-09-11 | Stop reason: HOSPADM

## 2020-09-09 RX ORDER — MAGNESIUM SULFATE HEPTAHYDRATE 40 MG/ML
2 INJECTION, SOLUTION INTRAVENOUS AS NEEDED
Status: DISCONTINUED | OUTPATIENT
Start: 2020-09-09 | End: 2020-09-11 | Stop reason: HOSPADM

## 2020-09-09 RX ORDER — OLANZAPINE 10 MG/1
30 TABLET ORAL NIGHTLY
Status: DISCONTINUED | OUTPATIENT
Start: 2020-09-09 | End: 2020-09-11 | Stop reason: HOSPADM

## 2020-09-09 RX ORDER — CHOLECALCIFEROL (VITAMIN D3) 125 MCG
5 CAPSULE ORAL NIGHTLY PRN
Status: DISCONTINUED | OUTPATIENT
Start: 2020-09-09 | End: 2020-09-11 | Stop reason: HOSPADM

## 2020-09-09 RX ORDER — MIRTAZAPINE 15 MG/1
90 TABLET, FILM COATED ORAL NIGHTLY
Status: DISCONTINUED | OUTPATIENT
Start: 2020-09-09 | End: 2020-09-11 | Stop reason: HOSPADM

## 2020-09-09 RX ORDER — BUSPIRONE HYDROCHLORIDE 15 MG/1
15 TABLET ORAL 4 TIMES DAILY PRN
Status: DISCONTINUED | OUTPATIENT
Start: 2020-09-09 | End: 2020-09-11 | Stop reason: HOSPADM

## 2020-09-09 RX ADMIN — Medication 10 ML: at 21:04

## 2020-09-09 RX ADMIN — MIRTAZAPINE 90 MG: 15 TABLET, FILM COATED ORAL at 21:03

## 2020-09-09 RX ADMIN — OLANZAPINE 30 MG: 10 TABLET ORAL at 21:03

## 2020-09-09 RX ADMIN — POTASSIUM CHLORIDE 20 MEQ: 1500 TABLET, EXTENDED RELEASE ORAL at 18:24

## 2020-09-09 RX ADMIN — GABAPENTIN 800 MG: 400 CAPSULE ORAL at 21:03

## 2020-09-09 RX ADMIN — LEVETIRACETAM 1500 MG: 15 INJECTION INTRAVENOUS at 18:24

## 2020-09-09 RX ADMIN — NICOTINE 1 PATCH: 21 PATCH TRANSDERMAL at 21:04

## 2020-09-09 NOTE — H&P
[facility] Medicine Services      Patient Name: Kristy Falk  : 1970  MRN: [id number]  Primary Care Physician: Elizabeth Goode APRN  Date of admission: 2020    Patient Care Team:  Elizabeth Goode APRN as PCP - General          Subjective   History Present Illness     Chief Complaint:   Chief Complaint   Patient presents with   • Altered Mental Status         Ms. Falk is a 50 y.o. female presents to [facility] ER on 2020 complaining of altered mental status after the patient's daughter had witnessed the patient having a seizure prior to arrival.  It was reported that the patient had a hard time buckling her seatbelt and then after that she did not really remember what happened.  Patient states she remembers bits and pieces from the day since then.  The daughter reports that the patient started shaking uncontrollably that lasted about 30 seconds and has had some altered mental status since that time.  Daughter stated that it looked like a seizure.  Patient reports no history of seizure disorder.  Patient ports no recent change to her medications, recent illness, fever, chills, cough, chest pain, shortness of breath, urinary symptoms, swelling in her legs bilaterally, nausea, vomiting, diarrhea or headache.  Upon evaluation patient is now alert and oriented 3 out of 3.  Patient states that she just feels tired.  Patient denies any family history of seizures.  Patient denies any illicit drug use.    Upon chart review patient was last admitted to [facility] on 2020 for metabolic encephalopathy that was likely multifactorial to hyponatremia and hypertensive encephalopathy.  Altered mental status had resolved with correction of electrolytes.    In the ED, CBC largely unremarkable, CMP largely unremarkable other than potassium low at 3.2.  TSH normal at 1.1.  Ammonia normal at 32, hCG negative, magnesium normal at 1.7.  UDS negative.  CT head shows  normal exam.  EKG shows sinus tachycardia 103 bpm, probable left atrial enlargement, nonspecific repolarization abnormalities in diffuse leads, no ST elevation apparent.  Patient is afebrile, pulse in the 90s, on room air oxygen and 96% SPO2 and blood pressure 150s over 90s.  Patient given 1500 mg of Keppra and potassium replacement in ED.  Regular diet and seizure precautions ordered.     Review of Systems   Constitution: Positive for malaise/fatigue. Negative for chills and fever.   HENT: Negative.    Cardiovascular: Negative.  Negative for chest pain.   Respiratory: Negative.  Negative for cough and shortness of breath.    Skin: Negative.    Musculoskeletal: Negative.    Gastrointestinal: Negative.  Negative for abdominal pain, diarrhea, nausea and vomiting.   Genitourinary: Negative.    Neurological: Positive for seizures. Negative for dizziness, focal weakness, headaches, light-headedness, numbness, paresthesias and weakness.   Psychiatric/Behavioral: Positive for altered mental status (resolved).           Personal History     Past Medical History:   Past Medical History:   Diagnosis Date   • Anxiety    • Depression    • Hypertension        Surgical History:      Past Surgical History:   Procedure Laterality Date   • CHOLECYSTECTOMY             Family History: family history includes Hypertension in her mother. Otherwise pertinent FHx was reviewed and unremarkable.     Social History:  reports that she has been smoking. She has a 30.00 pack-year smoking history. She has never used smokeless tobacco. She reports that she does not drink alcohol or use drugs.      Medications:  Prior to Admission medications    Medication Sig Start Date End Date Taking? Authorizing Provider   buPROPion XL (FORFIVO XL) 450 MG 24 hr tablet Take 450 mg by mouth Daily.   Yes Darwin Montesinos MD   busPIRone (BUSPAR) 15 MG tablet Take 15 mg by mouth 4 (Four) Times a Day As Needed.   Yes Darwin Montesinos MD   gabapentin  (NEURONTIN) 800 MG tablet Take 800 mg by mouth 4 (Four) Times a Day.   Yes Provider, MD Darwin   mirtazapine (REMERON) 45 MG tablet Take 90 mg by mouth Every Night.   Yes Provider, MD Darwin   OLANZapine (ZyPREXA) 20 MG tablet Take 1.5 tablets by mouth Every Night. 6/2/20  Yes Esequiel Estrada DO   buPROPion XL (WELLBUTRIN XL) 150 MG 24 hr tablet Take 300 mg by mouth Daily.  9/9/20  ProviderDarwin MD   lisinopril (PRINIVIL,ZESTRIL) 10 MG tablet Take 0.5 tablets by mouth Daily. 6/2/20 9/9/20  Esequiel Estrada DO   mirtazapine (REMERON) 45 MG tablet Take 1 tablet by mouth Every Night. 6/2/20 9/9/20  Esequiel Estrada DO       Allergies:  No Known Allergies    Objective   Objective     Vital Signs  Temp:  [98.5 °F (36.9 °C)] 98.5 °F (36.9 °C)  Heart Rate:  [] 91  Resp:  [19] 19  BP: (151-185)/(82-97) 151/82  SpO2:  [95 %-96 %] 96 %  on   ;   Device (Oxygen Therapy): room air  Body mass index is 25.84 kg/m².    Physical Exam   Constitutional: She is oriented to person, place, and time. She appears well-developed and well-nourished. No distress.   HENT:   Head: Normocephalic and atraumatic.   Nose: Nose normal.   Mouth/Throat: No oropharyngeal exudate.   Eyes: Pupils are equal, round, and reactive to light. Conjunctivae and EOM are normal.   Neck: Normal range of motion.   Cardiovascular: Normal rate, regular rhythm, normal heart sounds and intact distal pulses.   S1, S2 audible.   Pulmonary/Chest: Effort normal and breath sounds normal. No respiratory distress. She has no wheezes. She has no rales.   On room air.   Abdominal: Soft. Bowel sounds are normal. She exhibits no distension. There is no tenderness.   Musculoskeletal: Normal range of motion. She exhibits no edema, tenderness or deformity.   Neurological: She is alert and oriented to person, place, and time. No cranial nerve deficit or sensory deficit. She exhibits normal muscle tone. Coordination normal.   Skin: Skin is warm. No rash noted. She  is not diaphoretic. No erythema.   Psychiatric: She has a normal mood and affect. Her behavior is normal.   Nursing note and vitals reviewed.      Results Review:  I have personally reviewed most recent cardiac tracings, lab results and radiology images and interpretations and agree with findings.    Results from last 7 days   Lab Units 09/09/20  1658   WBC 10*3/mm3 8.70   HEMOGLOBIN g/dL 13.6   HEMATOCRIT % 40.0   PLATELETS 10*3/mm3 417     Results from last 7 days   Lab Units 09/09/20  1658   SODIUM mmol/L 139   POTASSIUM mmol/L 3.2*   CHLORIDE mmol/L 102   CO2 mmol/L 23.0   BUN  <3*   CREATININE mg/dL 0.95   GLUCOSE mg/dL 88   CALCIUM mg/dL 9.4   ALT (SGPT) U/L 11   AST (SGOT) U/L 10     Estimated Creatinine Clearance: 91.4 mL/min (by C-G formula based on SCr of 0.95 mg/dL).  Brief Urine Lab Results  (Last result in the past 365 days)      Color   Clarity   Blood   Leuk Est   Nitrite   Protein   CREAT   Urine HCG        06/24/20 2141 Yellow Clear Negative Negative Negative Negative               Microbiology Results (last 10 days)     ** No results found for the last 240 hours. **          ECG/EMG Results (most recent)     Procedure Component Value Units Date/Time    ECG 12 Lead [[id number]] Collected:  09/09/20 1704     Updated:  09/09/20 1717    Narrative:       HEART RATE= 103  bpm  RR Interval= 584  ms  HI Interval= 131  ms  P Horizontal Axis= 7  deg  P Front Axis= 35  deg  QRSD Interval= 93  ms  QT Interval= 383  ms  QRS Axis= 31  deg  T Wave Axis= -89  deg  - ABNORMAL ECG -  Sinus tachycardia  Probable left atrial enlargement  Nonspecific repol abnormality, diffuse leads  When compared with ECG of 24-Jun-2020 20:53:39,  Significant repolarization change  Significant axis, voltage or hypertrophy change  Electronically Signed By:   Date and Time of Study: 2020-09-09 17:04:43               Results for orders placed during the hospital encounter of 11/02/19   Adult Transthoracic Echo Complete W/ Cont if  Necessary Per Protocol    Narrative · Left ventricular systolic function is normal.  · Moderate aortic valve regurgitation is present.  · Mild mitral valve regurgitation is present  · Mild tricuspid valve regurgitation is present.     Normal LV and RV function and size  Normal atrial sizes  Moderate aortic insufficiency with central coaptation point origin  Mildly elevated PA systolic pressure with mild pulmonary hypertension  Normal diastolic function estimated  Mild MR, mild TR           Ct Head Without Contrast    Result Date: 9/9/2020   1. CT the head is normal.  Electronically Signed By-DR. Carloz Kendrick MD On:9/9/2020 5:43 PM This report was finalized on [id number] by DR. Carloz Kendrick MD.        Estimated Creatinine Clearance: 91.4 mL/min (by C-G formula based on SCr of 0.95 mg/dL).    Assessment/Plan   Assessment/Plan       Active Hospital Problems    Diagnosis  POA   • **Seizure (CMS/HCC) [R56.9]  Yes   • Anxiety disorder [F41.9]  Yes   • Tobacco abuse [Z72.0]  Yes   • Peripheral neuropathy [G62.9]  Yes   • Hypokalemia [E87.6]  Yes   • History of alcoholism (CMS/HCC) [F10.21]  Not Applicable   • Depression [F32.9]  Yes      Resolved Hospital Problems   No resolved problems to display.     Seizure, new onset  -Unclear what caused the seizure, no family history or personal history of seizure disorder  -CBC largely unremarkable, CMP largely unremarkable other than potassium low at 3.2.    -TSH normal at 1.1.    -Ammonia normal at 32, hCG negative, magnesium normal at 1.7.    -UDS negative.    -CT head shows normal exam.    -EKG shows sinus tachycardia 103 bpm, probable left atrial enlargement, nonspecific repolarization abnormalities in diffuse leads, no ST elevation apparent.   -Patient is afebrile, pulse in the 90s, on room air oxygen and 96% SPO2 and blood pressure 150s over 90s.    -Patient given 1500 mg of Keppra and potassium replacement in ED.    -Regular diet and seizure precautions  ordered.   -Neurology consult as this is new onset seizures for patient  -Check lipid panel, B12, hemoglobin A1c  -Seizure precautions, fall precautions  -Regular diet ordered    Hypokalemia  -potassium low at 3.2  -Replacement protocol ordered    Anxiety/depression/mood disorder  -Continue home bupropion, BuSpar, Remeron, Zyprexa  -followed by Dr. Alcala outpatient    Peripheral neuropathy  -Continue home Neurontin, inspect verified    Tobacco abuse  -NicoDerm patch ordered  -Encourage cessation    History of alcohol abuse in remission  -Denies any recent alcohol use      VTE Prophylaxis - SCDs  Mechanical Order History:     None      Pharmalogical Order History:     None          CODE STATUS:    Code Status and Medical Interventions:   Ordered at: 09/09/20 2009     Code Status:    CPR     Medical Interventions (Level of Support Prior to Arrest):    Full       This patient has been examined wearing appropriate Personal Protective Equipment and discussed with hospital infection control department. 09/09/20      I discussed the patient's findings and my recommendations with patient.        Electronically signed by BRITTANY Aquino, 09/09/20, 7:59 PM.  [facility]ist Team

## 2020-09-09 NOTE — ED NOTES
Pt states she had a hard time buckling her seatbelt, and after that she doesn't really remember what happened. Said she was disoriented.      Candelaria Aguirre RN  09/09/20 [id number]

## 2020-09-09 NOTE — ED PROVIDER NOTES
Subjective   History of Present Illness  Seizure, altered mental status  50-year-old female was getting into a car today with her daughter and became confused about the seatbelt notes the last thing she remembers for daughter states that she started shaking uncontrollably lasted about 30 seconds and has had some altered mental status since that time.  Daughter states it looked like a seizure.  Patient reports no history of seizure disorder.  She reports no exposures or any changes in her medications.  She reports no fevers or chills or trauma.  There is no reported focal numbness or weakness or headache.  Review of Systems   Constitutional: Negative.    HENT: Negative.    Eyes: Negative.    Respiratory: Negative.    Cardiovascular: Negative.    Gastrointestinal: Negative.    Genitourinary: Negative.    Musculoskeletal: Negative.    Skin: Negative.    Neurological: Positive for seizures. Negative for syncope and headaches.   Hematological: Negative.    Psychiatric/Behavioral: Positive for confusion.       Past Medical History:   Diagnosis Date   • Anxiety    • Depression    • Hypertension        No Known Allergies    Past Surgical History:   Procedure Laterality Date   • CHOLECYSTECTOMY         History reviewed. No pertinent family history.    Social History     Socioeconomic History   • Marital status:      Spouse name: Not on file   • Number of children: Not on file   • Years of education: Not on file   • Highest education level: Not on file   Tobacco Use   • Smoking status: Current Every Day Smoker     Packs/day: 1.50   • Smokeless tobacco: Never Used   Substance and Sexual Activity   • Alcohol use: No     Frequency: Never   • Drug use: No     Comment: sober since 2016.   • Sexual activity: Defer     Prior to Admission medications    Medication Sig Start Date End Date Taking? Authorizing Provider   buPROPion XL (WELLBUTRIN XL) 150 MG 24 hr tablet Take 300 mg by mouth Daily.    Provider, MD Darwin  "  busPIRone (BUSPAR) 15 MG tablet Take 15 mg by mouth 4 (Four) Times a Day As Needed.    ProviderDarwin MD   gabapentin (NEURONTIN) 800 MG tablet Take 800 mg by mouth 4 (Four) Times a Day.    ProviderDarwin MD   lisinopril (PRINIVIL,ZESTRIL) 10 MG tablet Take 0.5 tablets by mouth Daily. 6/2/20   Esequiel Estrada DO   mirtazapine (REMERON) 45 MG tablet Take 90 mg by mouth Every Night.    ProviderDarwin MD   mirtazapine (REMERON) 45 MG tablet Take 1 tablet by mouth Every Night. 6/2/20   Esequiel Estrada DO   OLANZapine (ZyPREXA) 20 MG tablet Take 1.5 tablets by mouth Every Night. 6/2/20   Esequiel Estrada DO     /93   Pulse 105   Temp 98.5 °F (36.9 °C)   Resp 19   Ht 177.8 cm (70\")   Wt 81.7 kg (180 lb 1.9 oz)   SpO2 95%   BMI 25.84 kg/m²   I examined the patient using the appropriate personal protective equipment.          Objective   Physical Exam  General: Well-developed well-appearing, no acute distress, awake and alert  Eyes: Pupils incised round and reactive, sclera nonicteric  HEENT: Mucous membranes moist, no mucosal swelling  Neck: Supple, no nuchal rigidity, no lymphadenopathy  Respirations: Respirations nonlabored, equal breath sounds bilaterally, clear lungs  Heart regular rate and rhythm, no murmurs rubs or gallops,   Abdomen soft nontender nondistended, no hepatosplenomegaly,   Extremities no clubbing cyanosis or edema, calves are symmetric and nontender  Neuro cranial nerves II through XII intact , normal sensory/motor function and strength in four extremities, no slurred speech, no facial droop, normal finger to nose,  no nuchal rigidity  Psych oriented to person and place, mildly confused  Skin no rash, brisk cap refill  Procedures           ED Course      Results for orders placed or performed during the hospital encounter of 09/09/20   Comprehensive Metabolic Panel   Result Value Ref Range    Glucose 88 65 - 99 mg/dL    BUN      Creatinine 0.95 0.57 - 1.00 mg/dL    Sodium " 139 136 - 145 mmol/L    Potassium 3.2 (L) 3.5 - 5.2 mmol/L    Chloride 102 98 - 107 mmol/L    CO2 23.0 22.0 - 29.0 mmol/L    Calcium 9.4 8.6 - 10.5 mg/dL    Total Protein 7.2 6.0 - 8.5 g/dL    Albumin 4.50 3.50 - 5.20 g/dL    ALT (SGPT) 11 1 - 33 U/L    AST (SGOT) 10 1 - 32 U/L    Alkaline Phosphatase 94 39 - 117 U/L    Total Bilirubin 0.2 0.0 - 1.2 mg/dL    eGFR Non African Amer 62 >60 mL/min/1.73    Globulin 2.7 gm/dL    A/G Ratio 1.7 g/dL    BUN/Creatinine Ratio      Anion Gap 14.0 5.0 - 15.0 mmol/L   hCG, Serum, Qualitative   Result Value Ref Range    HCG Qualitative Negative Negative   Urine Drug Screen - Urine, Clean Catch   Result Value Ref Range    Amphet/Methamphet, Screen Negative Negative    Barbiturates Screen, Urine Negative Negative    Benzodiazepine Screen, Urine Negative Negative    Cocaine Screen, Urine Negative Negative    Opiate Screen Negative Negative    THC, Screen, Urine Negative Negative    Methadone Screen, Urine Negative Negative    Oxycodone Screen, Urine Negative Negative   Ethanol   Result Value Ref Range    Ethanol % <0.010 %   Magnesium   Result Value Ref Range    Magnesium 1.7 1.6 - 2.6 mg/dL   Ammonia   Result Value Ref Range    Ammonia 32 11 - 51 umol/L   TSH   Result Value Ref Range    TSH 1.110 0.270 - 4.200 uIU/mL   CBC Auto Differential   Result Value Ref Range    WBC 8.70 3.40 - 10.80 10*3/mm3    RBC 4.30 3.77 - 5.28 10*6/mm3    Hemoglobin 13.6 12.0 - 15.9 g/dL    Hematocrit 40.0 34.0 - 46.6 %    MCV 92.9 79.0 - 97.0 fL    MCH 31.7 26.6 - 33.0 pg    MCHC 34.1 31.5 - 35.7 g/dL    RDW 14.5 12.3 - 15.4 %    RDW-SD 47.3 37.0 - 54.0 fl    MPV 6.7 6.0 - 12.0 fL    Platelets 417 140 - 450 10*3/mm3    Neutrophil % 81.3 (H) 42.7 - 76.0 %    Lymphocyte % 13.7 (L) 19.6 - 45.3 %    Monocyte % 3.9 (L) 5.0 - 12.0 %    Eosinophil % 0.4 0.3 - 6.2 %    Basophil % 0.7 0.0 - 1.5 %    Neutrophils, Absolute 7.10 (H) 1.70 - 7.00 10*3/mm3    Lymphocytes, Absolute 1.20 0.70 - 3.10 10*3/mm3     Monocytes, Absolute 0.30 0.10 - 0.90 10*3/mm3    Eosinophils, Absolute 0.00 0.00 - 0.40 10*3/mm3    Basophils, Absolute 0.10 0.00 - 0.20 10*3/mm3    nRBC 0.1 0.0 - 0.2 /100 WBC   BUN   Result Value Ref Range    BUN <3 (L) 6 - 20 mg/dL   POC Glucose Once   Result Value Ref Range    Glucose 88 70 - 105 mg/dL     Ct Head Without Contrast    Result Date: 9/9/2020   1. CT the head is normal.  Electronically Signed By-DR. Carloz Kendrick MD On:9/9/2020 5:43 PM This report was finalized on [id number] by DR. Carloz Kendrick MD.                                         MDM  Patient presents with some altered mental status with reports of a seizure like event prior to arrival.  She does appear somewhat postictal.  During the emergency room course her mentation cleared.  She has no focal deficits to suggest stroke.  She has been recently evaluated for periods of altered mental status today's event may shed some light on these past events perhaps being seizure related as well.  She was given IV Keppra.  She was placed in hospital observation for further evaluation of these seizure events.  Hospital service was paged.  She was given some oral potassium for some mild hypokalemia.  Patient is agreeable to plan  Final diagnoses:   Seizure (CMS/[facility])   Hypokalemia            Triston Weiss MD  09/09/20 1125

## 2020-09-10 ENCOUNTER — APPOINTMENT (OUTPATIENT)
Dept: MRI IMAGING | Facility: HOSPITAL | Age: 50
End: 2020-09-10

## 2020-09-10 ENCOUNTER — APPOINTMENT (OUTPATIENT)
Dept: CARDIOLOGY | Facility: HOSPITAL | Age: 50
End: 2020-09-10

## 2020-09-10 ENCOUNTER — APPOINTMENT (OUTPATIENT)
Dept: NEUROLOGY | Facility: HOSPITAL | Age: 50
End: 2020-09-10

## 2020-09-10 LAB
ANION GAP SERPL CALCULATED.3IONS-SCNC: 10 MMOL/L (ref 5–15)
BASOPHILS # BLD AUTO: 0 10*3/MM3 (ref 0–0.2)
BASOPHILS NFR BLD AUTO: 0.3 % (ref 0–1.5)
BH CV XLRA MEAS LEFT CCA RATIO VEL: 146 CM/SEC
BH CV XLRA MEAS LEFT DIST CCA EDV: 30.8 CM/SEC
BH CV XLRA MEAS LEFT DIST CCA PSV: 110 CM/SEC
BH CV XLRA MEAS LEFT DIST ICA EDV: -54.9 CM/SEC
BH CV XLRA MEAS LEFT DIST ICA PSV: -113 CM/SEC
BH CV XLRA MEAS LEFT ICA RATIO VEL: -113 CM/SEC
BH CV XLRA MEAS LEFT ICA/CCA RATIO: -0.77
BH CV XLRA MEAS LEFT PROX CCA EDV: 40.7 CM/SEC
BH CV XLRA MEAS LEFT PROX CCA PSV: 146 CM/SEC
BH CV XLRA MEAS LEFT PROX ECA PSV: -138 CM/SEC
BH CV XLRA MEAS LEFT PROX ICA EDV: -36.4 CM/SEC
BH CV XLRA MEAS LEFT PROX ICA PSV: -106 CM/SEC
BH CV XLRA MEAS LEFT PROX SCLA PSV: 221 CM/SEC
BH CV XLRA MEAS LEFT VERTEBRAL A PSV: 56.5 CM/SEC
BH CV XLRA MEAS RIGHT CCA RATIO VEL: 118 CM/SEC
BH CV XLRA MEAS RIGHT DIST CCA EDV: 32.3 CM/SEC
BH CV XLRA MEAS RIGHT DIST CCA PSV: 98.2 CM/SEC
BH CV XLRA MEAS RIGHT DIST ICA EDV: -40.6 CM/SEC
BH CV XLRA MEAS RIGHT DIST ICA PSV: -93.2 CM/SEC
BH CV XLRA MEAS RIGHT ICA RATIO VEL: -100 CM/SEC
BH CV XLRA MEAS RIGHT ICA/CCA RATIO: -0.85
BH CV XLRA MEAS RIGHT PROX CCA EDV: 34.8 CM/SEC
BH CV XLRA MEAS RIGHT PROX CCA PSV: 118 CM/SEC
BH CV XLRA MEAS RIGHT PROX ECA PSV: -125 CM/SEC
BH CV XLRA MEAS RIGHT PROX ICA EDV: -33.6 CM/SEC
BH CV XLRA MEAS RIGHT PROX ICA PSV: -100 CM/SEC
BH CV XLRA MEAS RIGHT PROX SCLA PSV: 195 CM/SEC
BH CV XLRA MEAS RIGHT VERTEBRAL A PSV: 51.6 CM/SEC
BUN SERPL-MCNC: 7 MG/DL (ref 6–20)
BUN SERPL-MCNC: ABNORMAL MG/DL
BUN/CREAT SERPL: ABNORMAL
CALCIUM SPEC-SCNC: 8.5 MG/DL (ref 8.6–10.5)
CHLORIDE SERPL-SCNC: 105 MMOL/L (ref 98–107)
CHOLEST SERPL-MCNC: 238 MG/DL (ref 0–200)
CO2 SERPL-SCNC: 27 MMOL/L (ref 22–29)
CREAT SERPL-MCNC: 1 MG/DL (ref 0.57–1)
DEPRECATED RDW RBC AUTO: 46.8 FL (ref 37–54)
EOSINOPHIL # BLD AUTO: 0.1 10*3/MM3 (ref 0–0.4)
EOSINOPHIL NFR BLD AUTO: 1.1 % (ref 0.3–6.2)
ERYTHROCYTE [DISTWIDTH] IN BLOOD BY AUTOMATED COUNT: 14.3 % (ref 12.3–15.4)
GFR SERPL CREATININE-BSD FRML MDRD: 59 ML/MIN/1.73
GLUCOSE SERPL-MCNC: 92 MG/DL (ref 65–99)
HBA1C MFR BLD: 5 % (ref 3.5–5.6)
HCT VFR BLD AUTO: 36.7 % (ref 34–46.6)
HDLC SERPL-MCNC: 49 MG/DL (ref 40–60)
HGB BLD-MCNC: 12.5 G/DL (ref 12–15.9)
LDLC SERPL CALC-MCNC: 164 MG/DL (ref 0–100)
LDLC/HDLC SERPL: 3.35 {RATIO}
LYMPHOCYTES # BLD AUTO: 2.8 10*3/MM3 (ref 0.7–3.1)
LYMPHOCYTES NFR BLD AUTO: 32.8 % (ref 19.6–45.3)
MAGNESIUM SERPL-MCNC: 2.1 MG/DL (ref 1.6–2.6)
MCH RBC QN AUTO: 31.9 PG (ref 26.6–33)
MCHC RBC AUTO-ENTMCNC: 33.9 G/DL (ref 31.5–35.7)
MCV RBC AUTO: 94.1 FL (ref 79–97)
MONOCYTES # BLD AUTO: 0.4 10*3/MM3 (ref 0.1–0.9)
MONOCYTES NFR BLD AUTO: 4.5 % (ref 5–12)
NEUTROPHILS NFR BLD AUTO: 5.1 10*3/MM3 (ref 1.7–7)
NEUTROPHILS NFR BLD AUTO: 61.3 % (ref 42.7–76)
NRBC BLD AUTO-RTO: 0.2 /100 WBC (ref 0–0.2)
PHOSPHATE SERPL-MCNC: 4.4 MG/DL (ref 2.5–4.5)
PLATELET # BLD AUTO: 327 10*3/MM3 (ref 140–450)
PMV BLD AUTO: 7 FL (ref 6–12)
POTASSIUM SERPL-SCNC: 3.4 MMOL/L (ref 3.5–5.2)
RBC # BLD AUTO: 3.9 10*6/MM3 (ref 3.77–5.28)
SODIUM SERPL-SCNC: 142 MMOL/L (ref 136–145)
TRIGL SERPL-MCNC: 124 MG/DL (ref 0–150)
VIT B12 BLD-MCNC: 333 PG/ML (ref 211–946)
VLDLC SERPL-MCNC: 24.8 MG/DL
WBC # BLD AUTO: 8.4 10*3/MM3 (ref 3.4–10.8)

## 2020-09-10 PROCEDURE — A9579 GAD-BASE MR CONTRAST NOS,1ML: HCPCS | Performed by: INTERNAL MEDICINE

## 2020-09-10 PROCEDURE — 93880 EXTRACRANIAL BILAT STUDY: CPT

## 2020-09-10 PROCEDURE — 82607 VITAMIN B-12: CPT | Performed by: PHYSICIAN ASSISTANT

## 2020-09-10 PROCEDURE — G0378 HOSPITAL OBSERVATION PER HR: HCPCS

## 2020-09-10 PROCEDURE — 25010000002 GADOTERIDOL PER 1 ML: Performed by: INTERNAL MEDICINE

## 2020-09-10 PROCEDURE — 95816 EEG AWAKE AND DROWSY: CPT

## 2020-09-10 PROCEDURE — 70553 MRI BRAIN STEM W/O & W/DYE: CPT

## 2020-09-10 PROCEDURE — 85025 COMPLETE CBC W/AUTO DIFF WBC: CPT | Performed by: PHYSICIAN ASSISTANT

## 2020-09-10 PROCEDURE — 95816 EEG AWAKE AND DROWSY: CPT | Performed by: PSYCHIATRY & NEUROLOGY

## 2020-09-10 PROCEDURE — 99243 OFF/OP CNSLTJ NEW/EST LOW 30: CPT | Performed by: NURSE PRACTITIONER

## 2020-09-10 PROCEDURE — 80048 BASIC METABOLIC PNL TOTAL CA: CPT | Performed by: PHYSICIAN ASSISTANT

## 2020-09-10 PROCEDURE — 84100 ASSAY OF PHOSPHORUS: CPT | Performed by: NURSE PRACTITIONER

## 2020-09-10 PROCEDURE — 99225 PR SBSQ OBSERVATION CARE/DAY 25 MINUTES: CPT | Performed by: INTERNAL MEDICINE

## 2020-09-10 PROCEDURE — 80061 LIPID PANEL: CPT | Performed by: PHYSICIAN ASSISTANT

## 2020-09-10 PROCEDURE — 83735 ASSAY OF MAGNESIUM: CPT | Performed by: PHYSICIAN ASSISTANT

## 2020-09-10 RX ORDER — LANOLIN ALCOHOL/MO/W.PET/CERES
1000 CREAM (GRAM) TOPICAL DAILY
Status: DISCONTINUED | OUTPATIENT
Start: 2020-09-10 | End: 2020-09-11 | Stop reason: HOSPADM

## 2020-09-10 RX ORDER — LEVETIRACETAM 500 MG/1
500 TABLET ORAL 2 TIMES DAILY
Status: DISCONTINUED | OUTPATIENT
Start: 2020-09-10 | End: 2020-09-11 | Stop reason: HOSPADM

## 2020-09-10 RX ORDER — ATORVASTATIN CALCIUM 20 MG/1
20 TABLET, FILM COATED ORAL NIGHTLY
Status: DISCONTINUED | OUTPATIENT
Start: 2020-09-10 | End: 2020-09-11 | Stop reason: HOSPADM

## 2020-09-10 RX ADMIN — LEVETIRACETAM 500 MG: 500 TABLET ORAL at 20:42

## 2020-09-10 RX ADMIN — LEVETIRACETAM 500 MG: 500 TABLET ORAL at 11:45

## 2020-09-10 RX ADMIN — CYANOCOBALAMIN TAB 1000 MCG 1000 MCG: 1000 TAB at 18:15

## 2020-09-10 RX ADMIN — GABAPENTIN 800 MG: 400 CAPSULE ORAL at 11:45

## 2020-09-10 RX ADMIN — BUPROPION HYDROCHLORIDE 450 MG: 150 TABLET, EXTENDED RELEASE ORAL at 09:04

## 2020-09-10 RX ADMIN — OLANZAPINE 30 MG: 10 TABLET ORAL at 20:42

## 2020-09-10 RX ADMIN — GABAPENTIN 800 MG: 400 CAPSULE ORAL at 18:15

## 2020-09-10 RX ADMIN — GABAPENTIN 800 MG: 400 CAPSULE ORAL at 20:42

## 2020-09-10 RX ADMIN — Medication 10 ML: at 09:04

## 2020-09-10 RX ADMIN — GADOTERIDOL 17 ML: 279.3 INJECTION, SOLUTION INTRAVENOUS at 17:15

## 2020-09-10 RX ADMIN — Medication 10 ML: at 20:42

## 2020-09-10 RX ADMIN — MIRTAZAPINE 90 MG: 15 TABLET, FILM COATED ORAL at 20:42

## 2020-09-10 RX ADMIN — NICOTINE 1 PATCH: 21 PATCH TRANSDERMAL at 20:42

## 2020-09-10 RX ADMIN — POTASSIUM CHLORIDE 40 MEQ: 1500 TABLET, EXTENDED RELEASE ORAL at 05:25

## 2020-09-10 RX ADMIN — GABAPENTIN 800 MG: 400 CAPSULE ORAL at 09:04

## 2020-09-10 RX ADMIN — ATORVASTATIN CALCIUM 20 MG: 20 TABLET, FILM COATED ORAL at 20:42

## 2020-09-10 NOTE — PROGRESS NOTES
"      [facility] Medicine Services Daily Progress Note      Hospitalist Team  LOS 0 days      Patient Care Team:  Elizabeth Goode APRN as PCP - General    Patient Location: 252/1      Subjective   Subjective     Chief Complaint / Subjective  Chief Complaint   Patient presents with   • Altered Mental Status         Brief Synopsis of Hospital Course/HPI  The patient is a 50-year-old female who was admitted with altered mental status after her daughter apparently witnessed what appeared to be a seizure at home.  This episode was accompanied by difficulty with normal activities such as fastening the patient seatbelt.  The patient has no real memories of this event.  The daughter does describe what sounds like a grand mal seizure.    Date::        Review of Systems   Constitution: Negative.   HENT: Negative.    Eyes: Negative.    Cardiovascular: Negative.    Respiratory: Negative.    Endocrine: Negative.    Hematologic/Lymphatic: Negative.    Skin: Negative.    Musculoskeletal: Negative.    Gastrointestinal: Negative.    Genitourinary: Negative.    Neurological: Negative.    Psychiatric/Behavioral: Negative.    Allergic/Immunologic: Negative.    All other systems reviewed and are negative.        Objective   Objective      Vital Signs  Temp:  [98.1 °F (36.7 °C)-98.4 °F (36.9 °C)] 98.4 °F (36.9 °C)  Heart Rate:  [59-91] 59  Resp:  [15-20] 20  BP: (110-151)/(59-82) 112/60  Oxygen Therapy  SpO2: 94 %  Pulse Oximetry Type: Intermittent  Device (Oxygen Therapy): room air  Flowsheet Rows      First Filed Value   Admission Height  177.8 cm (70\") Documented at 09/09/2020 1643   Admission Weight  81.7 kg (180 lb 1.9 oz) Documented at 09/09/2020 1643        Intake & Output (last 3 days)       09/07 0701 - 09/08 0700 09/08 0701 - 09/09 0700 09/09 0701 - 09/10 0700 09/10 0701 - 09/11 0700    P.O.    350    Total Intake(mL/kg)    350 (4.2)    Net    +350                Lines, Drains & Airways    Active LDAs     " Name:   Placement date:   Placement time:   Site:   Days:    Peripheral IV 09/09/20 1658 Right;Medial Antecubital   09/09/20 1658    Antecubital   1                  Physical Exam:    Physical Exam   Constitutional: She is oriented to person, place, and time. She appears well-developed and well-nourished. No distress.   HENT:   Head: Normocephalic and atraumatic.   Right Ear: External ear normal.   Left Ear: External ear normal.   Nose: Nose normal.   Mouth/Throat: Oropharynx is clear and moist. No oropharyngeal exudate.   Eyes: Pupils are equal, round, and reactive to light. Conjunctivae and EOM are normal. Right eye exhibits no discharge. Left eye exhibits no discharge. No scleral icterus.   Neck: Normal range of motion. No JVD present. No tracheal deviation present. No thyromegaly present.   Cardiovascular: Normal rate, regular rhythm, normal heart sounds and intact distal pulses. Exam reveals no gallop and no friction rub.   No murmur heard.  Pulmonary/Chest: Effort normal and breath sounds normal. No stridor. No respiratory distress. She has no wheezes. She has no rales. She exhibits no tenderness.   Abdominal: Soft. Bowel sounds are normal. She exhibits no distension and no mass. There is no tenderness. There is no rebound and no guarding. No hernia.   Musculoskeletal: Normal range of motion. She exhibits no edema, tenderness or deformity.   Lymphadenopathy:     She has no cervical adenopathy.   Neurological: She is alert and oriented to person, place, and time. No cranial nerve deficit or sensory deficit. She exhibits normal muscle tone. Coordination normal.   The patient seemed appropriate.  No trauma was noted suggestive of a seizure.   Skin: Skin is warm and dry. No rash noted. She is not diaphoretic. No erythema.   Psychiatric: She has a normal mood and affect. Her behavior is normal.   Nursing note and vitals reviewed.    Procedures:    Results Review:     I reviewed the patient's new clinical  results.    Lab Results (last 24 hours)     Procedure Component Value Units Date/Time    Hemoglobin A1c [[id number]]  (Normal) Collected:  09/09/20 1658    Specimen:  Blood Updated:  09/10/20 1517     Hemoglobin A1C 5.0 %     Narrative:       Hemoglobin A1C Reference Range:    <5.7 %        Normal  5.7-6.4 %     Increased risk for diabetes  > 6.4 %        Diabetes       These guidelines have been recommended by the American Diabetic Association for Hgb A1c.      The following 2010 guidelines have been recommended by the American Diabetes Association for Hemoglobin A1c.    HBA1c 5.7-6.4% Increased risk for future diabetes (pre-diabetes)  HBA1c     >6.4% Diabetes      Phosphorus [[id number]]  (Normal) Collected:  09/10/20 0227    Specimen:  Blood Updated:  09/10/20 1105     Phosphorus 4.4 mg/dL     Vitamin B12 [[id number]]  (Normal) Collected:  09/10/20 0227    Specimen:  Blood Updated:  09/10/20 1052     Vitamin B-12 333 pg/mL     Narrative:       Results may be falsely increased if patient taking Biotin.      BUN [[id number]]  (Normal) Collected:  09/10/20 0227    Specimen:  Blood Updated:  09/10/20 0732     BUN 7 mg/dL     Basic Metabolic Panel [[id number]]  (Abnormal) Collected:  09/10/20 0227    Specimen:  Blood Updated:  09/10/20 0423     Glucose 92 mg/dL      BUN --     Comment: Testing performed by alternate method        Creatinine 1.00 mg/dL      Sodium 142 mmol/L      Potassium 3.4 mmol/L      Chloride 105 mmol/L      CO2 27.0 mmol/L      Calcium 8.5 mg/dL      eGFR Non African Amer 59 mL/min/1.73      BUN/Creatinine Ratio --     Comment: Testing not performed        Anion Gap 10.0 mmol/L     Narrative:       GFR Normal >60  Chronic Kidney Disease <60  Kidney Failure <15      Magnesium [[id number]]  (Normal) Collected:  09/10/20 0227    Specimen:  Blood Updated:  09/10/20 0423     Magnesium 2.1 mg/dL     Lipid Panel [[id number]]  (Abnormal) Collected:  09/10/20 0227    Specimen:  Blood Updated:  09/10/20 0423      Total Cholesterol 238 mg/dL      Triglycerides 124 mg/dL      HDL Cholesterol 49 mg/dL      LDL Cholesterol  164 mg/dL      VLDL Cholesterol 24.8 mg/dL      LDL/HDL Ratio 3.35    Narrative:       Cholesterol Reference Ranges  (U.S. Department of Health and Human Services ATP III Classifications)    Desirable          <200 mg/dL  Borderline High    200-239 mg/dL  High Risk          >240 mg/dL      Triglyceride Reference Ranges  (U.S. Department of Health and Human Services ATP III Classifications)    Normal           <150 mg/dL  Borderline High  150-199 mg/dL  High             200-499 mg/dL  Very High        >500 mg/dL    HDL Reference Ranges  (U.S. Department of Health and Human Services ATP III Classifcations)    Low     <40 mg/dl (major risk factor for CHD)  High    >60 mg/dl ('negative' risk factor for CHD)        LDL Reference Ranges  (U.S. Department of Health and Human Services ATP III Classifcations)    Optimal          <100 mg/dL  Near Optimal     100-129 mg/dL  Borderline High  130-159 mg/dL  High             160-189 mg/dL  Very High        >189 mg/dL    CBC Auto Differential [[id number]]  (Abnormal) Collected:  09/10/20 0227    Specimen:  Blood Updated:  09/10/20 0407     WBC 8.40 10*3/mm3      RBC 3.90 10*6/mm3      Hemoglobin 12.5 g/dL      Hematocrit 36.7 %      MCV 94.1 fL      MCH 31.9 pg      MCHC 33.9 g/dL      RDW 14.3 %      RDW-SD 46.8 fl      MPV 7.0 fL      Platelets 327 10*3/mm3      Neutrophil % 61.3 %      Lymphocyte % 32.8 %      Monocyte % 4.5 %      Eosinophil % 1.1 %      Basophil % 0.3 %      Neutrophils, Absolute 5.10 10*3/mm3      Lymphocytes, Absolute 2.80 10*3/mm3      Monocytes, Absolute 0.40 10*3/mm3      Eosinophils, Absolute 0.10 10*3/mm3      Basophils, Absolute 0.00 10*3/mm3      nRBC 0.2 /100 WBC     Extra Tubes [[id number]] Collected:  09/09/20 1658    Specimen:  Blood, Venous Line Updated:  09/09/20 1800    Narrative:       The following orders were created for panel order  Extra Tubes.  Procedure                               Abnormality         Status                     ---------                               -----------         ------                     Gold Top - SST[[id number]]                                   Final result                 Please view results for these tests on the individual orders.    Gold Top - SST [[id number]] Collected:  09/09/20 1658    Specimen:  Blood Updated:  09/09/20 1800     Extra Tube Hold for add-ons.     Comment: Auto resulted.       Urine Drug Screen - Urine, Clean Catch [[id number]]  (Normal) Collected:  09/09/20 1714    Specimen:  Urine, Clean Catch Updated:  09/09/20 1750     Amphet/Methamphet, Screen Negative     Barbiturates Screen, Urine Negative     Benzodiazepine Screen, Urine Negative     Cocaine Screen, Urine Negative     Opiate Screen Negative     THC, Screen, Urine Negative     Methadone Screen, Urine Negative     Oxycodone Screen, Urine Negative    Narrative:       Negative Thresholds For Drugs Screened:     Amphetamines               500 ng/ml   Barbiturates               200 ng/ml   Benzodiazepines            100 ng/ml   Cocaine                    300 ng/ml   Methadone                  300 ng/ml   Opiates                    300 ng/ml   Oxycodone                  100 ng/ml   THC                        50 ng/ml    The Normal Value for all drugs tested is negative. This report includes final unconfirmed screening results to be used for medical treatment purposes only. Unconfirmed results must not be used for non-medical purposes such as employment or legal testing. Clinical consideration should be applied to any drug of abuse test, particulary when unconfirmed results are used.  All urine drugs of abuse requests without chain of custody are for medical screening purposes only.  False positives are possible.      TSH [[id number]]  (Normal) Collected:  09/09/20 1658    Specimen:  Blood Updated:  09/09/20 1742     TSH 1.110 uIU/mL     BUN  [[id number]]  (Abnormal) Collected:  09/09/20 1658    Specimen:  Blood Updated:  09/09/20 1741     BUN <3 mg/dL     Comprehensive Metabolic Panel [[id number]]  (Abnormal) Collected:  09/09/20 1658    Specimen:  Blood Updated:  09/09/20 1736     Glucose 88 mg/dL      BUN --     Comment: Testing performed by alternate method        Creatinine 0.95 mg/dL      Sodium 139 mmol/L      Potassium 3.2 mmol/L      Chloride 102 mmol/L      CO2 23.0 mmol/L      Calcium 9.4 mg/dL      Total Protein 7.2 g/dL      Albumin 4.50 g/dL      ALT (SGPT) 11 U/L      AST (SGOT) 10 U/L      Alkaline Phosphatase 94 U/L      Total Bilirubin 0.2 mg/dL      eGFR Non African Amer 62 mL/min/1.73      Globulin 2.7 gm/dL      A/G Ratio 1.7 g/dL      BUN/Creatinine Ratio --     Comment: Testing not performed        Anion Gap 14.0 mmol/L     Narrative:       GFR Normal >60  Chronic Kidney Disease <60  Kidney Failure <15      Ethanol [[id number]] Collected:  09/09/20 1658    Specimen:  Blood Updated:  09/09/20 1736     Ethanol % <0.010 %     Narrative:       Plasma Ethanol Clinical Symptoms:    ETOH (%)               Clinical Symptom  .01-.05              No apparent influence  .03-.12              Euphoria, Diminished judgment and attention   .09-.25              Impaired comprehension, Muscle incoordination  .18-.30              Confusion, Staggered gait, Slurred speech  .25-.40              Markedly decreased response to stimuli, unable to stand or                        walk, vomitting, sleep or stupor  .35-.50              Comatose, Anesthesia, Subnormal body temperature        Magnesium [[id number]]  (Normal) Collected:  09/09/20 1658    Specimen:  Blood Updated:  09/09/20 1736     Magnesium 1.7 mg/dL     hCG, Serum, Qualitative [[id number]]  (Normal) Collected:  09/09/20 1658    Specimen:  Blood Updated:  09/09/20 1732     HCG Qualitative Negative    Ammonia [[id number]]  (Normal) Collected:  09/09/20 1657    Specimen:  Blood Updated:  09/09/20 1720      Ammonia 32 umol/L         Hemoglobin A1C   Date Value Ref Range Status   09/09/2020 5.0 3.5 - 5.6 % Final               Lab Results   Component Value Date    LIPASE 28 03/10/2017     Lab Results   Component Value Date    CHOL 238 (H) 09/10/2020    TRIG 124 09/10/2020    HDL 49 09/10/2020     (H) 09/10/2020       No results found for: INTRAOP, PREDX, FINALDX, COMDX    Microbiology Results (last 10 days)     ** No results found for the last 240 hours. **          ECG/EMG Results (most recent)     Procedure Component Value Units Date/Time    ECG 12 Lead [[id number]] Collected:  09/09/20 1704     Updated:  09/10/20 1331    Narrative:       HEART RATE= 103  bpm  RR Interval= 584  ms  MN Interval= 131  ms  P Horizontal Axis= 7  deg  P Front Axis= 35  deg  QRSD Interval= 93  ms  QT Interval= 383  ms  QRS Axis= 31  deg  T Wave Axis= -89  deg  - ABNORMAL ECG -  Sinus tachycardia  Probable left atrial enlargement  Nonspecific repol abnormality, diffuse leads  When compared with ECG of 24-Jun-2020 20:53:39,  Significant repolarization change  Significant axis, voltage or hypertrophy change  Electronically Signed By: John Robbins (ELIZABETH) 10-Sep-2020 13:29:39  Date and Time of Study: 2020-09-09 17:04:43          Results for orders placed during the hospital encounter of 09/09/20   Duplex Carotid Ultrasound CAR    Narrative · Right internal carotid artery mild stenosis.  · Left internal carotid artery mild stenosis.          Results for orders placed during the hospital encounter of 11/02/19   Adult Transthoracic Echo Complete W/ Cont if Necessary Per Protocol    Narrative · Left ventricular systolic function is normal.  · Moderate aortic valve regurgitation is present.  · Mild mitral valve regurgitation is present  · Mild tricuspid valve regurgitation is present.     Normal LV and RV function and size  Normal atrial sizes  Moderate aortic insufficiency with central coaptation point origin  Mildly elevated PA systolic  pressure with mild pulmonary hypertension  Normal diastolic function estimated  Mild MR, mild TR           Ct Head Without Contrast    Result Date: 9/9/2020   1. CT the head is normal.  Electronically Signed By-DR. Carloz Kendrick MD On:9/9/2020 5:43 PM This report was finalized on [id number] by DR. Carloz Kendrick MD.          Xrays, labs reviewed personally by physician.    Medication Review:   I have reviewed the patient's current medication list      Scheduled Meds    atorvastatin 20 mg Oral Nightly   gabapentin 800 mg Oral 4x Daily   gadoteridol 20 mL Intravenous Once in imaging   levETIRAcetam 500 mg Oral BID   mirtazapine 90 mg Oral Nightly   nicotine 1 patch Transdermal Nightly   OLANZapine 30 mg Oral Nightly   sodium chloride 10 mL Intravenous Q12H   vitamin B-12 1,000 mcg Oral Daily       Meds Infusions       Meds PRN  •  acetaminophen **OR** acetaminophen **OR** acetaminophen  •  aluminum-magnesium hydroxide-simethicone  •  bisacodyl  •  busPIRone  •  LORazepam  •  magnesium hydroxide  •  magnesium sulfate **OR** magnesium sulfate in D5W 1g/100mL (PREMIX)  •  melatonin  •  nitroglycerin  •  ondansetron **OR** ondansetron  •  potassium chloride  •  [COMPLETED] Insert peripheral IV **AND** sodium chloride  •  sodium chloride        Assessment/Plan   Assessment/Plan     Active Hospital Problems    Diagnosis  POA   • **Seizure (CMS/HCC) [R56.9]  Yes   • Anxiety disorder [F41.9]  Yes   • Tobacco abuse [Z72.0]  Yes   • Peripheral neuropathy [G62.9]  Yes   • Hypokalemia [E87.6]  Yes   • History of alcoholism (CMS/HCC) [F10.21]  Not Applicable   • Depression [F32.9]  Yes      Resolved Hospital Problems   No resolved problems to display.       MEDICAL DECISION MAKING COMPLEXITY BY PROBLEM:   1.  New onset seizure       -EEG pending       -Wellbutrin has been discontinued       -Appreciate neurology consultation            -We will continue the Keppra at their recommendation.    2.   Hypokalemia  -Patient has been placed on the electrolyte protocol    3.  Peripheral neuropathy  -Continue Neurontin    4.  History of alcoholism  -Patient reports that she has not been consuming alcohol.  -We will continue to monitor for signs of withdrawal should they occur.    5.  Anxiety/depression  -Continue her Zyprexa, Remeron and BuSpar    6.  Smoking addiction  -Smoking cessation was discussed with the patient  VTE Prophylaxis -   Mechanical Order History:      Ordered        09/09/20 2008  Place Sequential Compression Device  Once         09/09/20 2008  Maintain Sequential Compression Device  Continuous                 Pharmalogical Order History:     None            Code Status -   Code Status and Medical Interventions:   Ordered at: 09/09/20 2009     Code Status:    CPR     Medical Interventions (Level of Support Prior to Arrest):    Full       This patient has been examined wearing appropriate Personal Protective Equipment and discussed with hospital infection control department. 09/10/20        Discharge Planning  Likely home          Electronically signed by Fabiola Sheikh MD, 09/10/20, 17:13.  [facility] Hospitalist Team

## 2020-09-10 NOTE — PROGRESS NOTES
Continued Stay Note  NKECHI Devries     Patient Name: Kristy Falk  MRN: [id number]  Today's Date: 9/10/2020    Admit Date: 9/9/2020    Discharge Plan     Row Name 09/10/20 1514       Plan    Plan  Routine d/c to home    Plan Comments  SW screened pt for ETOH abuse and treatment needs, per CM conversation. Pt denies ETOH abuse.        JOSE Waldrop, LSW    Office: [phone]  Cell: [phone]  Fax: [phone]  E-mail: [email or web address]

## 2020-09-10 NOTE — CONSULTS
"    Primary Care Provider: Elizabeth Goode APRN     Consult requested by: BRITTANY Aquino    Reason for Consultation: Neurological evaluation     History taken from: patient chart RN    Chief complaint: Seizure       SUBJECTIVE:    History of present illness: Background per H&P: Ms. Falk is a 50 y.o. female who presented to [facility] ER on 9/9/2020 complaining of altered mental status after the patient's daughter had witnessed the patient having a seizure prior to arrival.  It was reported that the patient had a hard time buckling her seatbelt and then after that she did not really remember what happened.  Patient states she remembers bits and pieces from the day since then.  The daughter reports that the patient started shaking uncontrollably that lasted about 30 seconds and has had some altered mental status since that time.  Daughter stated that it looked like a seizure.  Patient reports no history of seizure disorder.  Patient ports no recent change to her medications, recent illness, fever, chills, cough, chest pain, shortness of breath, urinary symptoms, swelling in her legs bilaterally, nausea, vomiting, diarrhea or headache.  Patient denies any family history of seizures.  Patient denies any illicit drug use.     Upon chart review patient was last admitted to [facility] on 6/1/2020 for metabolic encephalopathy that was likely multifactorial to hyponatremia and hypertensive encephalopathy. She reported non-compliance with her antihypertensives due to side effects of \"jitteriness\" and palpitations.  Altered mental status had resolved with correction of electrolytes. At that time, her UDS was notably positive for amphetamines.   - Portions of the above HPI were copied from previous encounters and edited as appropriate.    BP notable elevated upon admission (185/97).     Pt denies any complaints at this time. She does not have any recollection of the event yesterday. The last thing she " remembers is riding in the car with her daughter then she recalls the EMTs getting her out of the car. She denies any previous seizure activity or similar spells.     Review of Systems   Constitutional: Negative for chills, diaphoresis and fatigue.   Eyes: Negative for photophobia and visual disturbance.   Neurological: Negative for dizziness, tremors, seizures, syncope, facial asymmetry, speech difficulty, weakness, light-headedness, numbness and headaches.           PATIENT HISTORY:  Past Medical History:   Diagnosis Date   • Anxiety    • Depression    • Hypertension    , Past Surgical History:   Procedure Laterality Date   • CHOLECYSTECTOMY     , Family History   Problem Relation Age of Onset   • Hypertension Mother    , Social History     Tobacco Use   • Smoking status: Current Every Day Smoker     Packs/day: 1.50     Years: 20.00     Pack years: 30.00   • Smokeless tobacco: Never Used   Substance Use Topics   • Alcohol use: No     Frequency: Never   • Drug use: No     Comment: sober since 2016.   , Medications Prior to Admission   Medication Sig Dispense Refill Last Dose   • buPROPion XL (FORFIVO XL) 450 MG 24 hr tablet Take 450 mg by mouth Daily.   9/9/2020 at Unknown time   • busPIRone (BUSPAR) 15 MG tablet Take 15 mg by mouth 4 (Four) Times a Day As Needed.   9/9/2020 at Unknown time   • gabapentin (NEURONTIN) 800 MG tablet Take 800 mg by mouth 4 (Four) Times a Day.   9/9/2020 at Unknown time   • mirtazapine (REMERON) 45 MG tablet Take 90 mg by mouth Every Night.   9/8/2020 at Unknown time   • OLANZapine (ZyPREXA) 20 MG tablet Take 1.5 tablets by mouth Every Night. 30 tablet 0 9/8/2020 at Unknown time   , Scheduled Meds:    buPROPion  mg Oral Daily   gabapentin 800 mg Oral 4x Daily   mirtazapine 90 mg Oral Nightly   nicotine 1 patch Transdermal Nightly   OLANZapine 30 mg Oral Nightly   sodium chloride 10 mL Intravenous Q12H   , Continuous Infusions:   , PRN Meds:  •  acetaminophen **OR** acetaminophen  **OR** acetaminophen  •  aluminum-magnesium hydroxide-simethicone  •  bisacodyl  •  busPIRone  •  LORazepam  •  magnesium hydroxide  •  magnesium sulfate **OR** magnesium sulfate in D5W 1g/100mL (PREMIX)  •  melatonin  •  nitroglycerin  •  ondansetron **OR** ondansetron  •  potassium chloride  •  [COMPLETED] Insert peripheral IV **AND** sodium chloride  •  sodium chloride, Allergies:  Patient has no known allergies.    ________________________________________________________        OBJECTIVE:  Upon today's exam, pt is awake and alert.       Neurologic Exam  PHYSICAL EXAM:    CONSTITUTIONAL: The patient is in no apparent distress, bright awake and alert. There is no shortness of breath.     HEENT: There is no tenderness over the temporal arteries bilaterally. Normocephalic, atraumatic. TMJ open symmetrically without tenderness.    NECK: ROM normal, supple    RESPIRATORY: Breathing unlabored, Breath sounds are normal    CARDIAC: Regular rate and rhythm.     EXTREMITIES:  No clubbing, cyanosis or edema.    PSYCHIATRIC: Mood/affect normal, judgement normal, appropriate    NEUROLOGICAL:    Cognition:   Fully oriented.  Fund of knowledge excellent.  Concentration and attention normal.   Language normal with normal comprehension, fluent speech, intact repetition and naming.   Short and long term memory appears intact    Cranial nerves;    II - pupils bilaterally equal reacting to light,  No new Visual field deficits;  Fundoscopic exam- Not able to be done, non-dilated exam  III,IV,VI: EOMI with no diplopia  V: Normal facial sensations  VII: No facial asymmetry,  VIII: No New hearing abnormality  IX, X, XI: normal gag and shoulder shrug;  XII: tongue is in the midline.    Sensory:  Intact to light touch in all extremities.     Motor: Strength 5/5 bilaterally upper and lower extremities. No involuntary movements present. Normal tone and bulk.  Deep tendon reflexes: 2/4 and symmetrical in biceps, brachioradialis, triceps,  bilateral 2/4 knees and ankles. Both plantars are flexor.    Cerebellar: Finger to nose and mirror movements normal bilaterally.    Gait and balance: deferred    ________________________________________________________   RESULTS REVIEW:    VITAL SIGNS:   Temp:  [98.1 °F (36.7 °C)-98.5 °F (36.9 °C)] 98.1 °F (36.7 °C)  Heart Rate:  [] 59  Resp:  [18-19] 18  BP: (110-185)/(59-97) 116/59     LABS:  WBC   Date Value Ref Range Status   09/10/2020 8.40 3.40 - 10.80 10*3/mm3 Final     RBC   Date Value Ref Range Status   09/10/2020 3.90 3.77 - 5.28 10*6/mm3 Final     Hemoglobin   Date Value Ref Range Status   09/10/2020 12.5 12.0 - 15.9 g/dL Final     Hematocrit   Date Value Ref Range Status   09/10/2020 36.7 34.0 - 46.6 % Final     MCV   Date Value Ref Range Status   09/10/2020 94.1 79.0 - 97.0 fL Final     MCH   Date Value Ref Range Status   09/10/2020 31.9 26.6 - 33.0 pg Final     MCHC   Date Value Ref Range Status   09/10/2020 33.9 31.5 - 35.7 g/dL Final     RDW   Date Value Ref Range Status   09/10/2020 14.3 12.3 - 15.4 % Final     RDW-SD   Date Value Ref Range Status   09/10/2020 46.8 37.0 - 54.0 fl Final     MPV   Date Value Ref Range Status   09/10/2020 7.0 6.0 - 12.0 fL Final     Platelets   Date Value Ref Range Status   09/10/2020 327 140 - 450 10*3/mm3 Final     Neutrophil %   Date Value Ref Range Status   09/10/2020 61.3 42.7 - 76.0 % Final     Lymphocyte %   Date Value Ref Range Status   09/10/2020 32.8 19.6 - 45.3 % Final     Monocyte %   Date Value Ref Range Status   09/10/2020 4.5 (L) 5.0 - 12.0 % Final     Eosinophil %   Date Value Ref Range Status   09/10/2020 1.1 0.3 - 6.2 % Final     Basophil %   Date Value Ref Range Status   09/10/2020 0.3 0.0 - 1.5 % Final     Neutrophils, Absolute   Date Value Ref Range Status   09/10/2020 5.10 1.70 - 7.00 10*3/mm3 Final     Lymphocytes, Absolute   Date Value Ref Range Status   09/10/2020 2.80 0.70 - 3.10 10*3/mm3 Final     Monocytes, Absolute   Date Value Ref  Range Status   09/10/2020 0.40 0.10 - 0.90 10*3/mm3 Final     Eosinophils, Absolute   Date Value Ref Range Status   09/10/2020 0.10 0.00 - 0.40 10*3/mm3 Final     Basophils, Absolute   Date Value Ref Range Status   09/10/2020 0.00 0.00 - 0.20 10*3/mm3 Final     nRBC   Date Value Ref Range Status   09/10/2020 0.2 0.0 - 0.2 /100 WBC Final     Glucose   Date Value Ref Range Status   09/10/2020 92 65 - 99 mg/dL Final     BUN   Date Value Ref Range Status   09/10/2020   Final     Comment:     Testing performed by alternate method   09/10/2020 7 6 - 20 mg/dL Final     Creatinine   Date Value Ref Range Status   09/10/2020 1.00 0.57 - 1.00 mg/dL Final     Sodium   Date Value Ref Range Status   09/10/2020 142 136 - 145 mmol/L Final     Potassium   Date Value Ref Range Status   09/10/2020 3.4 (L) 3.5 - 5.2 mmol/L Final     Chloride   Date Value Ref Range Status   09/10/2020 105 98 - 107 mmol/L Final     CO2   Date Value Ref Range Status   09/10/2020 27.0 22.0 - 29.0 mmol/L Final     Calcium   Date Value Ref Range Status   09/10/2020 8.5 (L) 8.6 - 10.5 mg/dL Final     Total Protein   Date Value Ref Range Status   09/09/2020 7.2 6.0 - 8.5 g/dL Final     Albumin   Date Value Ref Range Status   09/09/2020 4.50 3.50 - 5.20 g/dL Final     ALT (SGPT)   Date Value Ref Range Status   09/09/2020 11 1 - 33 U/L Final     AST (SGOT)   Date Value Ref Range Status   09/09/2020 10 1 - 32 U/L Final     Alkaline Phosphatase   Date Value Ref Range Status   09/09/2020 94 39 - 117 U/L Final     Total Bilirubin   Date Value Ref Range Status   09/09/2020 0.2 0.0 - 1.2 mg/dL Final     eGFR Non  Amer   Date Value Ref Range Status   09/10/2020 59 (L) >60 mL/min/1.73 Final     BUN/Creatinine Ratio   Date Value Ref Range Status   09/10/2020   Final     Comment:     Testing not performed     Anion Gap   Date Value Ref Range Status   09/10/2020 10.0 5.0 - 15.0 mmol/L Final       Lab Results   Component Value Date    TSH 1.110 09/09/2020      (H) 09/10/2020    HGBA1C 4.9 2019         IMAGING STUDIES:  Ct Head Without Contrast    Result Date: 2020   1. CT the head is normal.  Electronically Signed By-DR. Carloz Kendrick MD On:2020 5:43 PM This report was finalized on [id number] by DR. Carloz Kendrick MD.      I reviewed the patient's new clinical results.      ________________________________________________________     PROBLEM LIST:    Seizure (CMS/HCC)    Depression    History of alcoholism (CMS/HCC)    Anxiety disorder    Tobacco abuse    Peripheral neuropathy    Hypokalemia          Assessment/Plan   ASSESSMENT/PLAN:  1. Generalized shaking and altered mental status, possible seizure vs encephalopathy second to hypertensive urgency.   - CT head: Normal study  - MRI brain w/wo: Pending  - Carotids: pending  - Echo (2019): Estimated EF 66 - 70%.  - EEG: pending  - EKG: Sinus tachycardia (). Probable left atrial enlargement. Nonspecific repol abnormality, diffuse leads  - Labs: A1C: P, B12: 333, LDL: 164, TSH: 1.110, ma.1, Calcium: 8.5, Phos: 4.4, Sodium: 142, Ammonia: 32, WBC 8.4, UDS negative  - Continue Keppra 500mg BID  - Strongly recommend to discontinue Wellbutrin due to increased seizure potential, consider alternative  - PT/OT/ST as appropriate, Neuro checks per protocol, DVT prophylaxis, seizure precautions  - Follow up with outpt neurology  - Discussed with Dr. Sheikh    SEIZURE/SYNCOPE INSTRUCTIONS:  -Recommended to observe all seizure precautions, including, but not limited to:   -No driving until seizure free for more than 3 months- as per State driving regulation / law;   -Avoid all high-risk activity, Take showers instead of baths, Avoid swimming without observation, Avoid open heat sources, Avoid working at heights, and Avoid engaging in all potentially hazardous activities.   -Patient expressed clear understanding.    2. Hypertensive with acute hypertensive urgency on admission, improved  - Recent  admission for hypertensive emergency and hypertensive encephalopathy in June 2020.   - Home med changed to lisinopril in June  - Strict BP monitoring, medication management per primary.     3. Hyperlipidemia  - Statin, recommend diet and lifestyle modifications    4. B12 Deficiency  - Replacement ordered  - Recommend cyanocobalamin 1000mcg PO daily or IM monthly    Will follow up on testing.     I discussed the patient's findings and my recommendations with patient, nursing staff and consulting provider    ANNA Bernabe  09/10/20  10:08

## 2020-09-10 NOTE — PROGRESS NOTES
Discharge Planning Assessment   [facility]     Patient Name: Kristy Falk  MRN: [id number]  Today's Date: 9/10/2020    Admit Date: 9/9/2020    Discharge Needs Assessment     Row Name 09/10/20 1104       Living Environment    Lives With  child(roxanne), dependent;spouse Spoke to patient per phone due to Covid process    Current Living Arrangements  home/apartment/condo    Primary Care Provided by  self    Able to Return to Prior Arrangements  yes       Resource/Environmental Concerns    Resource/Environmental Concerns  none    Transportation Concerns  car, none       Transition Planning    Patient/Family Anticipates Transition to  home with family    Patient/Family Anticipated Services at Transition  none    Transportation Anticipated  car, drives self;family or friend will provide       Discharge Needs Assessment    Concerns to be Addressed  no discharge needs identified    Equipment Currently Used at Home  none    Anticipated Changes Related to Illness  none    Equipment Needed After Discharge  none        Discharge Plan     Row Name 09/10/20 1105       Plan    Plan  Routine d/c to home            Confirmed PCP and Pharmacy        Demographic Summary     Row Name 09/10/20 1104       General Information    Admission Type  observation    Arrived From  emergency department    Referral Source  admission list    Reason for Consult  discharge planning    Preferred Language  English        Functional Status     Row Name 09/10/20 1104       Functional Status, IADL    Medications  independent    Meal Preparation  independent    Housekeeping  independent    Laundry  independent    Shopping  independent        DC Barriers - Pending Neuro Consult    Yeimy Roper RN, CM  Office Phone [phone]  Cell [phone]

## 2020-09-10 NOTE — PLAN OF CARE
Pt has had no issues during the night.  Will continue to monitor.     Problem: Patient Care Overview  Goal: Plan of Care Review  Outcome: Ongoing (interventions implemented as appropriate)  Goal: Individualization and Mutuality  Outcome: Ongoing (interventions implemented as appropriate)  Goal: Discharge Needs Assessment  Outcome: Ongoing (interventions implemented as appropriate)  Goal: Interprofessional Rounds/Family Conf  Outcome: Ongoing (interventions implemented as appropriate)     Problem: Seizure Disorder/Epilepsy (Adult)  Goal: Signs and Symptoms of Listed Potential Problems Will be Absent, Minimized or Managed (Seizure Disorder/Epilepsy)  Outcome: Ongoing (interventions implemented as appropriate)

## 2020-09-11 VITALS
WEIGHT: 179.01 LBS | OXYGEN SATURATION: 94 % | DIASTOLIC BLOOD PRESSURE: 83 MMHG | RESPIRATION RATE: 16 BRPM | TEMPERATURE: 98.2 F | HEIGHT: 70 IN | SYSTOLIC BLOOD PRESSURE: 127 MMHG | BODY MASS INDEX: 25.63 KG/M2 | HEART RATE: 65 BPM

## 2020-09-11 LAB
ANION GAP SERPL CALCULATED.3IONS-SCNC: 7 MMOL/L (ref 5–15)
BASOPHILS # BLD AUTO: 0 10*3/MM3 (ref 0–0.2)
BASOPHILS NFR BLD AUTO: 0.2 % (ref 0–1.5)
BUN SERPL-MCNC: 8 MG/DL (ref 6–20)
BUN SERPL-MCNC: ABNORMAL MG/DL
BUN/CREAT SERPL: ABNORMAL
CALCIUM SPEC-SCNC: 7.9 MG/DL (ref 8.6–10.5)
CHLORIDE SERPL-SCNC: 109 MMOL/L (ref 98–107)
CO2 SERPL-SCNC: 27 MMOL/L (ref 22–29)
CREAT SERPL-MCNC: 0.76 MG/DL (ref 0.57–1)
DEPRECATED RDW RBC AUTO: 48.1 FL (ref 37–54)
EOSINOPHIL # BLD AUTO: 0.1 10*3/MM3 (ref 0–0.4)
EOSINOPHIL NFR BLD AUTO: 1.4 % (ref 0.3–6.2)
ERYTHROCYTE [DISTWIDTH] IN BLOOD BY AUTOMATED COUNT: 14.5 % (ref 12.3–15.4)
GFR SERPL CREATININE-BSD FRML MDRD: 81 ML/MIN/1.73
GLUCOSE SERPL-MCNC: 106 MG/DL (ref 65–99)
HCT VFR BLD AUTO: 37.2 % (ref 34–46.6)
HGB BLD-MCNC: 12.3 G/DL (ref 12–15.9)
LYMPHOCYTES # BLD AUTO: 2.2 10*3/MM3 (ref 0.7–3.1)
LYMPHOCYTES NFR BLD AUTO: 32.2 % (ref 19.6–45.3)
MAGNESIUM SERPL-MCNC: 2.1 MG/DL (ref 1.6–2.6)
MCH RBC QN AUTO: 31.3 PG (ref 26.6–33)
MCHC RBC AUTO-ENTMCNC: 33 G/DL (ref 31.5–35.7)
MCV RBC AUTO: 94.8 FL (ref 79–97)
MONOCYTES # BLD AUTO: 0.4 10*3/MM3 (ref 0.1–0.9)
MONOCYTES NFR BLD AUTO: 5.6 % (ref 5–12)
NEUTROPHILS NFR BLD AUTO: 4.1 10*3/MM3 (ref 1.7–7)
NEUTROPHILS NFR BLD AUTO: 60.6 % (ref 42.7–76)
NRBC BLD AUTO-RTO: 0.1 /100 WBC (ref 0–0.2)
PLATELET # BLD AUTO: 310 10*3/MM3 (ref 140–450)
PMV BLD AUTO: 7 FL (ref 6–12)
POTASSIUM SERPL-SCNC: 3.7 MMOL/L (ref 3.5–5.2)
RBC # BLD AUTO: 3.92 10*6/MM3 (ref 3.77–5.28)
SODIUM SERPL-SCNC: 143 MMOL/L (ref 136–145)
WBC # BLD AUTO: 6.8 10*3/MM3 (ref 3.4–10.8)

## 2020-09-11 PROCEDURE — 85025 COMPLETE CBC W/AUTO DIFF WBC: CPT | Performed by: INTERNAL MEDICINE

## 2020-09-11 PROCEDURE — 99213 OFFICE O/P EST LOW 20 MIN: CPT | Performed by: NURSE PRACTITIONER

## 2020-09-11 PROCEDURE — 99217 PR OBSERVATION CARE DISCHARGE MANAGEMENT: CPT | Performed by: INTERNAL MEDICINE

## 2020-09-11 PROCEDURE — 80048 BASIC METABOLIC PNL TOTAL CA: CPT | Performed by: INTERNAL MEDICINE

## 2020-09-11 PROCEDURE — 83735 ASSAY OF MAGNESIUM: CPT | Performed by: PHYSICIAN ASSISTANT

## 2020-09-11 PROCEDURE — G0378 HOSPITAL OBSERVATION PER HR: HCPCS

## 2020-09-11 RX ORDER — ATORVASTATIN CALCIUM 20 MG/1
20 TABLET, FILM COATED ORAL NIGHTLY
Qty: 30 TABLET | Refills: 0 | Status: SHIPPED | OUTPATIENT
Start: 2020-09-11 | End: 2020-10-11

## 2020-09-11 RX ORDER — LEVETIRACETAM 500 MG/1
500 TABLET ORAL 2 TIMES DAILY
Qty: 60 TABLET | Refills: 0 | Status: SHIPPED | OUTPATIENT
Start: 2020-09-11 | End: 2020-10-11

## 2020-09-11 RX ORDER — NICOTINE 21 MG/24HR
1 PATCH, TRANSDERMAL 24 HOURS TRANSDERMAL NIGHTLY
Qty: 30 PATCH | Refills: 0 | Status: SHIPPED | OUTPATIENT
Start: 2020-09-11

## 2020-09-11 RX ADMIN — GABAPENTIN 800 MG: 400 CAPSULE ORAL at 09:22

## 2020-09-11 RX ADMIN — GABAPENTIN 800 MG: 400 CAPSULE ORAL at 13:33

## 2020-09-11 RX ADMIN — LEVETIRACETAM 500 MG: 500 TABLET ORAL at 09:22

## 2020-09-11 RX ADMIN — Medication 10 ML: at 09:22

## 2020-09-11 RX ADMIN — CYANOCOBALAMIN TAB 1000 MCG 1000 MCG: 1000 TAB at 09:22

## 2020-09-11 NOTE — PLAN OF CARE
Problem: Patient Care Overview  Goal: Plan of Care Review  Outcome: Ongoing (interventions implemented as appropriate)  Goal: Individualization and Mutuality  Outcome: Ongoing (interventions implemented as appropriate)  Goal: Discharge Needs Assessment  Outcome: Ongoing (interventions implemented as appropriate)  Goal: Interprofessional Rounds/Family Conf  Outcome: Ongoing (interventions implemented as appropriate)     Problem: Seizure Disorder/Epilepsy (Adult)  Goal: Signs and Symptoms of Listed Potential Problems Will be Absent, Minimized or Managed (Seizure Disorder/Epilepsy)  Outcome: Ongoing (interventions implemented as appropriate)

## 2020-09-11 NOTE — PLAN OF CARE
Problem: Patient Care Overview  Goal: Plan of Care Review  Outcome: Ongoing (interventions implemented as appropriate)  Flowsheets (Taken 9/11/2020 5340)  Plan of Care Reviewed With: patient  Outcome Summary: patient is stable awaiting discharge orders. she is refusing to leave monitor on at this time. she is dressed and anxious to leave. no seizure activity noted today. will continue to monitor  Goal: Individualization and Mutuality  Outcome: Ongoing (interventions implemented as appropriate)  Goal: Discharge Needs Assessment  Outcome: Ongoing (interventions implemented as appropriate)  Goal: Interprofessional Rounds/Family Conf  Outcome: Ongoing (interventions implemented as appropriate)     Problem: Seizure Disorder/Epilepsy (Adult)  Goal: Signs and Symptoms of Listed Potential Problems Will be Absent, Minimized or Managed (Seizure Disorder/Epilepsy)  Outcome: Ongoing (interventions implemented as appropriate)

## 2020-09-11 NOTE — DISCHARGE SUMMARY
[facility] Medicine Services  DISCHARGE SUMMARY        Prepared For PCP:  Elizabeth Goode APRN    Patient Name: Kristy Falk  : 1970  MRN: [id number]      Date of Admission:   2020    Date of Discharge:  2020    Length of stay:  LOS: 0 days     Hospital Course     Presenting Problem:   Hypokalemia [E87.6]  Seizure (CMS/HCC) [R56.9]      Active Hospital Problems    Diagnosis  POA   • **Seizure (CMS/HCC) [R56.9]  Yes     Priority: Medium   • Peripheral neuropathy [G62.9]  Yes     Priority: High   • Anxiety disorder [F41.9]  Yes     Priority: Medium   • Tobacco abuse [Z72.0]  Yes     Priority: Medium   • History of alcoholism (CMS/HCC) [F10.21]  Not Applicable     Priority: Medium   • Depression [F32.9]  Yes     Priority: Medium   • Hypokalemia [E87.6]  Yes      Resolved Hospital Problems   No resolved problems to display.           Hospital Course:  Kristy Falk is a 50 y.o. female who presented after an episode of alteration in her mental status.  The patient's daughter was with her and felt that as though the patient was having a seizure.  This event was preceded by an episode where the patient could not do things that she would normally not have issues with such as fastening a seatbelt in the car.  The patient had an MRI as well as a CT of the head and an EEG.  The EEG was significant for more of a global encephalopathy with diffuse slowing.  The patient's MRI was negative and the carotid ultrasound showed mild stenosis but nothing significant or actionable.  The patient was seen by neurology as well.  They started the patient on Keppra and gave her seizure precaution acknowledgments such as no driving for the next 3 months as well as to avoid other high risk activities such as baths or swimming without others where and watching out for her.  Nursing is gathering affectionate for the patient so that she has that to refer to.  The patient had tox screen done that  was negative.    The patient is a full code at the time of discharge.  The patient has no pending studies at discharge.  The patient is on a regular diet.  The patient has been given a fact sheet about restrictions and due to the possibility that she had a seizure that accounts for her mentation issues.  Patient should follow-up with her primary care provider in 3 to 5 days and with the neurologist for ongoing care.  The patient's medications are as listed in that section of this report.  The patient is discharged in satisfactory condition.      Recommendation for Outpatient Providers:             Reasons For Change In Medications and Indications for New Medications:        Day of Discharge     HPI:   The patient has had no recurrence of her episodes.  She has been cleared by neurology.  The patient did have 1 of her medications held and this seems to have improved things for her.  No new issues.    Vital Signs:   Temp:  [97.5 °F (36.4 °C)-98.2 °F (36.8 °C)] 98.2 °F (36.8 °C)  Heart Rate:  [63-80] 65  Resp:  [16-22] 16  BP: (114-127)/(65-83) 127/83     Physical Exam:  Physical Exam   Constitutional: She is oriented to person, place, and time. She appears well-developed and well-nourished. No distress.   HENT:   Head: Normocephalic and atraumatic.   Right Ear: External ear normal.   Left Ear: External ear normal.   Nose: Nose normal.   Mouth/Throat: Oropharynx is clear and moist. No oropharyngeal exudate.   Eyes: Pupils are equal, round, and reactive to light. Conjunctivae and EOM are normal. Right eye exhibits no discharge. Left eye exhibits no discharge. No scleral icterus.   Neck: Normal range of motion. No JVD present. No tracheal deviation present. No thyromegaly present.   Cardiovascular: Normal rate, regular rhythm, normal heart sounds and intact distal pulses. Exam reveals no gallop and no friction rub.   No murmur heard.  Pulmonary/Chest: Effort normal and breath sounds normal. No stridor. No respiratory  distress. She has no wheezes. She has no rales. She exhibits no tenderness.   Abdominal: Soft. Bowel sounds are normal. She exhibits no distension and no mass. There is no tenderness. There is no rebound and no guarding. No hernia.   Musculoskeletal: Normal range of motion. She exhibits no edema, tenderness or deformity.   Lymphadenopathy:     She has no cervical adenopathy.   Neurological: She is alert and oriented to person, place, and time. No cranial nerve deficit or sensory deficit. She exhibits normal muscle tone. Coordination normal.   Skin: Skin is warm and dry. No rash noted. She is not diaphoretic. No erythema.   Psychiatric: She has a normal mood and affect. Her behavior is normal.   Nursing note and vitals reviewed.      Pertinent  and/or Most Recent Results     Results from last 7 days   Lab Units 09/11/20  0255 09/10/20  0227 09/09/20  1658   WBC 10*3/mm3 6.80 8.40 8.70   HEMOGLOBIN g/dL 12.3 12.5 13.6   HEMATOCRIT % 37.2 36.7 40.0   PLATELETS 10*3/mm3 310 327 417   SODIUM mmol/L 143 142 139   POTASSIUM mmol/L 3.7 3.4* 3.2*   CHLORIDE mmol/L 109* 105 102   CO2 mmol/L 27.0 27.0 23.0   BUN  8 7 <3*   CREATININE mg/dL 0.76 1.00 0.95   GLUCOSE mg/dL 106* 92 88   CALCIUM mg/dL 7.9* 8.5* 9.4     Results from last 7 days   Lab Units 09/09/20  1658   BILIRUBIN mg/dL 0.2   ALK PHOS U/L 94   ALT (SGPT) U/L 11   AST (SGOT) U/L 10     Results from last 7 days   Lab Units 09/10/20  0227   CHOLESTEROL mg/dL 238*   TRIGLYCERIDES mg/dL 124   HDL CHOL mg/dL 49     Results from last 7 days   Lab Units 09/09/20  1658   TSH uIU/mL 1.110   HEMOGLOBIN A1C % 5.0       Brief Urine Lab Results  (Last result in the past 365 days)      Color   Clarity   Blood   Leuk Est   Nitrite   Protein   CREAT   Urine HCG        06/24/20 2141 Yellow Clear Negative Negative Negative Negative               Microbiology Results Abnormal     None          Ct Head Without Contrast    Result Date: 9/9/2020  Impression:  1. CT the head is normal.   Electronically Signed By-DR. Carloz Kendrick MD On:9/9/2020 5:43 PM This report was finalized on [id number] by DR. Carloz Kendrick MD.    Mri Brain With & Without Contrast    Result Date: 9/10/2020  Impression:  1. MRI the brain without and with contrast is normal  Electronically Signed By-DR. Carloz Kendrick MD On:9/10/2020 11:15 PM This report was finalized on [id number] by DR. Carloz Kendrick MD.      Results for orders placed during the hospital encounter of 09/09/20   Duplex Carotid Ultrasound CAR    Narrative · Right internal carotid artery mild stenosis.  · Left internal carotid artery mild stenosis.          Results for orders placed during the hospital encounter of 09/09/20   Duplex Carotid Ultrasound CAR    Narrative · Right internal carotid artery mild stenosis.  · Left internal carotid artery mild stenosis.          Results for orders placed during the hospital encounter of 11/02/19   Adult Transthoracic Echo Complete W/ Cont if Necessary Per Protocol    Narrative · Left ventricular systolic function is normal.  · Moderate aortic valve regurgitation is present.  · Mild mitral valve regurgitation is present  · Mild tricuspid valve regurgitation is present.     Normal LV and RV function and size  Normal atrial sizes  Moderate aortic insufficiency with central coaptation point origin  Mildly elevated PA systolic pressure with mild pulmonary hypertension  Normal diastolic function estimated  Mild MR, mild TR                   Test Results Pending at Discharge        Procedures Performed           Consults:   Consults     Date and Time Order Name Status Description    9/9/2020 2009 Inpatient Neurology Consult Other (see comments) (new onset seizures) Completed     9/9/2020 1750 Hospitalist (on-call MD unless specified) Completed             Discharge Details        Discharge Medications      New Medications      Instructions Start Date   atorvastatin 20 MG tablet  Commonly known  as:  LIPITOR   20 mg, Oral, Nightly      levETIRAcetam 500 MG tablet  Commonly known as:  KEPPRA   500 mg, Oral, 2 Times Daily      nicotine 21 MG/24HR patch  Commonly known as:  NICODERM CQ   1 patch, Transdermal, Nightly         Continue These Medications      Instructions Start Date   busPIRone 15 MG tablet  Commonly known as:  BUSPAR   15 mg, Oral, 4 Times Daily PRN      gabapentin 800 MG tablet  Commonly known as:  NEURONTIN   800 mg, Oral, 4 Times Daily      mirtazapine 45 MG tablet  Commonly known as:  REMERON   90 mg, Oral, Nightly      OLANZapine 20 MG tablet  Commonly known as:  ZyPREXA   30 mg, Oral, Nightly         Stop These Medications    buPROPion  MG 24 hr tablet  Commonly known as:  FORFIVO XL            No Known Allergies      Discharge Disposition:  Home or Self Care    Diet:  Hospital:  Diet Order   Procedures   • Diet Regular         Discharge Activity:         CODE STATUS:    Code Status and Medical Interventions:   Ordered at: 09/09/20 2009     Code Status:    CPR     Medical Interventions (Level of Support Prior to Arrest):    Full         Follow-up Appointments  No future appointments.          Condition on Discharge:      Stable      This patient has been examined wearing appropriate Personal Protective Equipment and discussed with hospital infection control department. 09/11/20      Electronically signed by Fabiola Sheikh MD, 09/11/20, 3:31 PM.      Time: I spent  25  minutes on this discharge activity which included face-to-face encounter with the patient/reviewing the data in the system/coordination of the care with the nursing staff as well as consultants/documentation/entering orders.

## 2020-09-11 NOTE — PROGRESS NOTES
LOS: 0 days     Chief Complaint:  Seizure       SUBJECTIVE:  History taken from: patient chart RN    Interval History: Ms. Falk is a 50 y.o. female who presented to [facility] ER on 9/9/2020 complaining of altered mental status after the patient's daughter had witnessed the patient having a seizure prior to arrival. She does not have any recollection of the event. The last thing she remembers is riding in the car with her daughter then she recalls the EMTs getting her out of the car. She denies any previous seizure activity or similar spells.  - Portions of the above HPI were copied from previous encounters and edited as appropriate.    Patient Complaints: Pt feels well and wants to go home. No complaints at this time. No further seizure activity.       Review of Systems   Constitutional: Negative for chills, diaphoresis and fatigue.   Eyes: Negative for photophobia and visual disturbance.   Neurological: Negative for dizziness, tremors, seizures, syncope, facial asymmetry, speech difficulty, weakness, light-headedness, numbness and headaches.   Psychiatric/Behavioral: Negative for confusion.          Pertinent PMH:  has a past medical history of Anxiety, Depression, and Hypertension.   ________________________________________________     OBJECTIVE:  Pt sleeping, but awakens easily.       Neurologic Exam    On exam:  GENERAL: NAD, awake and alert  HEENT: Normocephalic, Atraumatic. Oropharynx clear and moist.   RESP: Breathing unlabored, breath sounds normal  CARDIO: RRR  SKIN: Warm, dry. No apparent rash.   PSYCH: Mood/affect normal    NEURO:  Oriented x3  EOMI, PERRL, no visual field deficits  No facial asymmetry  Speech clear without dysarthria  Sensations intact and equal bilaterally  Strength 5/5 and equal in all extremities  No ataxia    ________________________________________________   RESULTS REVIEW    VITAL SIGNS:  Temp:  [97.5 °F (36.4 °C)-98.4 °F (36.9 °C)] 97.5 °F (36.4 °C)  Heart Rate:   [63-80] 65  Resp:  [18-22] 18  BP: (114-125)/(65-79) 125/79    LABS:   Lab Results   Component Value Date    WBC 6.80 09/11/2020    HGB 12.3 09/11/2020    HCT 37.2 09/11/2020    MCV 94.8 09/11/2020     09/11/2020     Lab Results   Component Value Date    GLUCOSE 106 (H) 09/11/2020    BUN  09/11/2020      Comment:      Testing performed by alternate method    BUN 8 09/11/2020    CREATININE 0.76 09/11/2020    EGFRIFNONA 81 09/11/2020    BCR  09/11/2020      Comment:      Testing not performed    K 3.7 09/11/2020    CO2 27.0 09/11/2020    CALCIUM 7.9 (L) 09/11/2020    ALBUMIN 4.50 09/09/2020    LABIL2 1.2 03/10/2017    AST 10 09/09/2020    ALT 11 09/09/2020       Lab Results   Component Value Date    TSH 1.110 09/09/2020     (H) 09/10/2020    HGBA1C 5.0 09/09/2020    [id number] 333 09/10/2020         IMAGING STUDIES:  Ct Head Without Contrast    Result Date: 9/9/2020   1. CT the head is normal.  Electronically Signed By-DR. Carloz Kendrick MD On:9/9/2020 5:43 PM This report was finalized on [id number] by DR. Carloz Kendrick MD.    Mri Brain With & Without Contrast    Result Date: 9/10/2020   1. MRI the brain without and with contrast is normal  Electronically Signed By-DR. Carloz Kendrick MD On:9/10/2020 11:15 PM This report was finalized on [id number] by DR. Carloz Kendrick MD.      I reviewed the patient's new clinical results.    ________________________________________________      PROBLEM LIST:    Seizure (CMS/HCC)    Depression    History of alcoholism (CMS/HCC)    Anxiety disorder    Tobacco abuse    Peripheral neuropathy    Hypokalemia        Assessment/Plan   ASSESSMENT/PLAN:  1. Generalized shaking and altered mental status, possible seizure vs encephalopathy second to hypertensive urgency.   - MRI brain w/wo: Normal study  - Carotids: Mild stenosis bilaterally  - EEG: Abnormal EEG due to presence of generalized slowing.  These findings are suggestive of  encephalopathy.  It may be due to toxic, metabolic, ischemic, infectious or structural etiology.  No epileptiform activity was seen during the recording  - Labs: A1C: 5.0, B12: 333, LDL: 164, TSH: 1.110, ma.1, Calcium: 8.5, Phos: 4.4, Sodium: 142, Ammonia: 32, WBC 8.4, UDS negative  - Continue Keppra 500mg BID at least until follow up with neurology outpt  - Wellbutrin has been discontinued due to increased seizure potential, consider alternative  - Follow up with outpt neurology     SEIZURE/SYNCOPE INSTRUCTIONS:  -Recommended to observe all seizure precautions, including, but not limited to:   -No driving until seizure free for more than 3 months- as per State driving regulation / law;   -Avoid all high-risk activity, Take showers instead of baths, Avoid swimming without observation, Avoid open heat sources, Avoid working at heights, and Avoid engaging in all potentially hazardous activities.   -Patient expressed clear understanding.     2. Hypertensive with acute hypertensive urgency on admission, improved  - Recent admission for hypertensive emergency and hypertensive encephalopathy in 2020.   - Home med changed to lisinopril in   - Strict BP monitoring, medication management per primary.      3. Tobacco abuse   - Smoking cessation education  - Nicotine patch    **Please refer to previous notes for further details and recommendations.     Will sign off. Please call with questions or concerns.       I discussed the patients findings and my recommendations with patient, nursing staff and consulting provider    ANNA Bernabe  20  10:30

## 2020-09-14 NOTE — PROGRESS NOTES
Case Management Discharge Note                Selected Continued Care - Discharged on 9/11/2020 Admission date: 9/9/2020 - Discharge disposition: Home or Self Care                 Final Discharge Disposition Code: 01 - home or self-care

## 2021-08-05 ENCOUNTER — HOSPITAL ENCOUNTER (EMERGENCY)
Facility: HOSPITAL | Age: 51
End: 2021-08-05
Admitting: EMERGENCY MEDICINE

## 2021-08-05 VITALS — OXYGEN SATURATION: 100 % | TEMPERATURE: 98 F

## 2021-08-05 DIAGNOSIS — I46.9 CARDIORESPIRATORY ARREST (HCC): Primary | ICD-10-CM

## 2021-08-05 PROCEDURE — 31500 INSERT EMERGENCY AIRWAY: CPT

## 2021-08-05 PROCEDURE — 99283 EMERGENCY DEPT VISIT LOW MDM: CPT

## 2021-08-05 PROCEDURE — 92950 HEART/LUNG RESUSCITATION CPR: CPT

## 2021-08-05 PROCEDURE — 25010000002 ATROPINE PER 0.01 MG: Performed by: EMERGENCY MEDICINE

## 2021-08-05 PROCEDURE — C1751 CATH, INF, PER/CENT/MIDLINE: HCPCS

## 2021-08-05 PROCEDURE — 25010000002 EPINEPHRINE 1 MG/10ML SOLUTION PREFILLED SYRINGE: Performed by: EMERGENCY MEDICINE

## 2021-08-05 RX ORDER — EPINEPHRINE 0.1 MG/ML
SYRINGE (ML) INJECTION
Status: COMPLETED | OUTPATIENT
Start: 2021-08-05 | End: 2021-08-05

## 2021-08-05 RX ORDER — ATROPINE SULFATE 1 MG/ML
INJECTION, SOLUTION INTRAMUSCULAR; INTRAVENOUS; SUBCUTANEOUS
Status: COMPLETED | OUTPATIENT
Start: 2021-08-05 | End: 2021-08-05

## 2021-08-05 RX ADMIN — EPINEPHRINE 1 MG: 0.1 INJECTION, SOLUTION ENDOTRACHEAL; INTRACARDIAC; INTRAVENOUS at 18:27

## 2021-08-05 RX ADMIN — EPINEPHRINE 1 MG: 0.1 INJECTION, SOLUTION ENDOTRACHEAL; INTRACARDIAC; INTRAVENOUS at 18:32

## 2021-08-05 RX ADMIN — EPINEPHRINE 1 MG: 0.1 INJECTION, SOLUTION ENDOTRACHEAL; INTRACARDIAC; INTRAVENOUS at 18:29

## 2021-08-05 RX ADMIN — ATROPINE SULFATE 1 MG: 1 INJECTION, SOLUTION INTRAMUSCULAR; INTRAVENOUS; SUBCUTANEOUS at 18:30

## 2021-08-05 RX ADMIN — ATROPINE SULFATE 1 MG: 1 INJECTION, SOLUTION INTRAMUSCULAR; INTRAVENOUS; SUBCUTANEOUS at 18:29

## 2021-08-05 RX ADMIN — SODIUM BICARBONATE 50 MEQ: 84 INJECTION, SOLUTION INTRAVENOUS at 18:29

## 2021-08-05 NOTE — ED NOTES
Spoke with ana  R/T death- ANA wants patient to have autopsy- pt now made coroners case all tubes and lines to remain in patient until time of autopsy- SHER also notified of pt death and that pt was made a coroners case-     Ester Jenkins  08/05/21 1950

## 2021-08-06 NOTE — ED PROVIDER NOTES
Subjective   Female presents in cardiac arrest.  The patient was noted to complain of shortness of breath and become unresponsive.  CPR was initiated by family members and continued by first responders.  An interosseous line was placed.  The patient was noted to be in pulseless electrical activity.  The patient had a supraglottic airway placed.  CPR was continued.  The patient has a history of seizures but no known seizures occurred today.          Review of Systems   Unable to perform ROS: Patient unresponsive       No past medical history on file.  History of hypertension and seizures.  The patient also had a history of alcoholism but had been in sobriety for several years  Not on File    No past surgical history on file.    No family history on file.    Social History     Socioeconomic History   • Marital status:      Spouse name: Not on file   • Number of children: Not on file   • Years of education: Not on file   • Highest education level: Not on file           Objective   Physical Exam  Unresponsive Otis Coma Scale 3   HEENT: Pupils equal and fixed. Conjunctivae are not injected. normal tympanic membranes. Oropharynx and nares are normal.   Neck: Supple. Midline trachea. No JVD. No goiter.   Chest: Coarse airway sounds, shallow and equal breath sounds bilaterally with bagging initially in fine V. fib   Abdomen: No active bowel sounds bowel sounds nontender nondistended. No rebound or peritoneal signs. No CVA tenderness.   Extremities no clubbing cyanosis or edema no spontaneous range of motion skin: Warm and dry, no rashes or petechia.   Lymphatic: No regional lymphadenopathy. No calf pain, swelling or Valdemar's sign    Procedures  #1.  The patient was orally intubated with a subglottic suctioning type endotracheal tube was secured at 24 cm kevin at the teeth placement was confirmed by auscultation and color change.  The patient was ventilated with 100% FiO2  #2.  Dr. Dodson prepped the right shoulder  with a swipe of chlorhexidine and needle was inserted into the right subclavian via the standard approach a wire was threaded through the needle.  The needle was removed a small skin incision was made with an 11 blade.  6 [place] catheter and dilator passed over the wire.  The wire and dilator were then removed.  It was secured with 0 silk x1.  Occlusive dressing was applied.         ED Course                                   The patient was given epinephrine and atropine.  Additionally bicarb was given due to the prolonged downtime.        MDM  Number of Diagnoses or Management Options  Risk of Complications, Morbidity, and/or Mortality  Presenting problems: high  Diagnostic procedures: high  Management options: high  General comments: Resuscitation efforts were discontinued secondary to lack of response to ACLS protocols.  The family was notified.  The  was notified.        Final diagnoses:   Cardiorespiratory arrest (CMS/[facility])       ED Disposition  ED Disposition     ED Disposition Condition Comment                No follow-up provider specified.       Medication List      No changes were made to your prescriptions during this visit.          Fabien Ahmadi MD  [phone]